# Patient Record
Sex: MALE | Race: WHITE | NOT HISPANIC OR LATINO
[De-identification: names, ages, dates, MRNs, and addresses within clinical notes are randomized per-mention and may not be internally consistent; named-entity substitution may affect disease eponyms.]

---

## 2018-06-25 PROBLEM — Z00.00 ENCOUNTER FOR PREVENTIVE HEALTH EXAMINATION: Status: ACTIVE | Noted: 2018-06-25

## 2020-03-12 ENCOUNTER — APPOINTMENT (OUTPATIENT)
Dept: PULMONOLOGY | Facility: CLINIC | Age: 50
End: 2020-03-12
Payer: COMMERCIAL

## 2020-03-12 VITALS
WEIGHT: 236 LBS | DIASTOLIC BLOOD PRESSURE: 60 MMHG | HEIGHT: 67 IN | SYSTOLIC BLOOD PRESSURE: 128 MMHG | HEART RATE: 66 BPM | BODY MASS INDEX: 37.04 KG/M2 | OXYGEN SATURATION: 96 %

## 2020-03-12 DIAGNOSIS — E66.9 OBESITY, UNSPECIFIED: ICD-10-CM

## 2020-03-12 DIAGNOSIS — Z78.9 OTHER SPECIFIED HEALTH STATUS: ICD-10-CM

## 2020-03-12 DIAGNOSIS — G47.33 OBSTRUCTIVE SLEEP APNEA (ADULT) (PEDIATRIC): ICD-10-CM

## 2020-03-12 DIAGNOSIS — Z86.39 PERSONAL HISTORY OF OTHER ENDOCRINE, NUTRITIONAL AND METABOLIC DISEASE: ICD-10-CM

## 2020-03-12 DIAGNOSIS — R06.02 SHORTNESS OF BREATH: ICD-10-CM

## 2020-03-12 DIAGNOSIS — Z83.3 FAMILY HISTORY OF DIABETES MELLITUS: ICD-10-CM

## 2020-03-12 DIAGNOSIS — F17.290 NICOTINE DEPENDENCE, OTHER TOBACCO PRODUCT, UNCOMPLICATED: ICD-10-CM

## 2020-03-12 PROCEDURE — 99204 OFFICE O/P NEW MOD 45 MIN: CPT | Mod: 25

## 2020-03-12 PROCEDURE — 94010 BREATHING CAPACITY TEST: CPT

## 2020-03-12 RX ORDER — ATORVASTATIN CALCIUM 20 MG/1
20 TABLET, FILM COATED ORAL
Refills: 0 | Status: ACTIVE | COMMUNITY

## 2020-03-12 RX ORDER — VALACYCLOVIR HYDROCHLORIDE 1 G/1
1 TABLET, FILM COATED ORAL
Refills: 0 | Status: ACTIVE | COMMUNITY

## 2020-03-12 RX ORDER — METFORMIN HYDROCHLORIDE 500 MG/1
500 TABLET, COATED ORAL
Refills: 0 | Status: ACTIVE | COMMUNITY

## 2020-03-12 NOTE — REASON FOR VISIT
[Initial] : an initial visit [Sleep Apnea] : sleep apnea [Shortness of Breath] : shortness of breath [Obesity] : obesity

## 2020-03-12 NOTE — HISTORY OF PRESENT ILLNESS
[Never] : never [Excessive Daytime Sleepiness] : excessive daytime sleepiness [Snoring] : snoring [Unrefreshing Sleep] : unrefreshing sleep [Sleepy When Sedentary] : sleepy when sedentary [None] : The patient is not currently being treated for this problem [Initial Evaluation] : an initial evaluation of [Excess Weight] : excess weight [Currently Experiencing] : The patient is currently experiencing symptoms. [Dyspnea] : dyspnea [Low Calorie Diet] : low calorie diet [Fair Compliance] : fair compliance with treatment [Fair Tolerance] : fair tolerance of treatment [Poor Symptom Control] : poor symptom control [Dyslipidemia] : dyslipidemia [Sleep Apnea] : sleep apnea [Diabetes] : diabetes [High] : high [Low Calorie] : low calorie [Well Balanced Diet] : well balanced meals [Daily] : exercises daily [Walking] : walking [TextBox_4] : He reports [employer] exposure for 6 months and is followed by [facility] annually. He reports a recent clear CXR through [facility]. [TextBox_29] : reports occasional cigar use [Witnessed Apnea During Sleep] : no witnessed apnea during sleep [Witnessed Gasping During Sleep] : no witnessed gasping during sleep

## 2020-03-12 NOTE — REVIEW OF SYSTEMS
[SOB on Exertion] : sob on exertion [Diabetes] : diabetes [Fever] : no fever [Chills] : no chills [Dry Eyes] : no dry eyes [Epistaxis] : no epistaxis [Eye Irritation] : no eye irritation [Nasal Congestion] : no nasal congestion [Postnasal Drip] : no postnasal drip [Sinus Problems] : no sinus problems [Cough] : no cough [Chest Tightness] : no chest tightness [Sputum] : no sputum [Dyspnea] : no dyspnea [Pleuritic Pain] : no pleuritic pain [Wheezing] : no wheezing [Chest Discomfort] : no chest discomfort [Edema] : no edema [Palpitations] : no palpitations [Syncope] : no syncope [Hay Fever] : no hay fever [Itchy Eyes] : no itchy eyes [Seasonal Allergies] : no seasonal allergies [GERD] : no gerd [Abdominal Pain] : no abdominal pain [Nausea] : no nausea [Vomiting] : no vomiting [Diarrhea] : no diarrhea [Constipation] : no constipation [Dysuria] : no urgency [Back Pain] : no back pain [Anemia] : no anemia [Headache] : no headache [Seizures] : no seizures [Dizziness] : no dizziness [Numbness] : no numbness [Paralysis] : no paralysis [Confusion] : no confusion [Depression] : no depression [Anxiety] : no anxiety [Thyroid Problem] : no thyroid problem

## 2020-03-12 NOTE — CONSULT LETTER
[Dear  ___] : Dear  [unfilled], [Consult Letter:] : I had the pleasure of evaluating your patient, [unfilled]. [Please see my note below.] : Please see my note below. [Consult Closing:] : Thank you very much for allowing me to participate in the care of this patient.  If you have any questions, please do not hesitate to contact me. [Sincerely,] : Sincerely, [FreeTextEntry3] : Jerrell Renee MD, FCCP, D. ABSM\par Pulmonary and Sleep Medicine\par [facility] [place]

## 2020-03-12 NOTE — PHYSICAL EXAM
[No Acute Distress] : no acute distress [Well Nourished] : well nourished [Well Developed] : well developed [Low Lying Soft Palate] : low lying soft palate [Enlarged Base of the Tongue] : enlarged base of the tongue [III] : Mallampati Class: III [Normal Appearance] : normal appearance [Supple] : supple [Normal Rate/Rhythm] : normal rate/rhythm [Normal S1, S2] : normal s1, s2 [No Murmurs] : no murmurs [No Resp Distress] : no resp distress [No Acc Muscle Use] : no acc muscle use [Normal Rhythm and Effort] : normal rhythm and effort [Clear to Auscultation Bilaterally] : clear to auscultation bilaterally [No Abnormalities] : no abnormalities [Benign] : benign [Not Tender] : not tender [Soft] : soft [Normal Gait] : normal gait [No Clubbing] : no clubbing [No Cyanosis] : no cyanosis [No Edema] : no edema [No Focal Deficits] : no focal deficits [Oriented x3] : oriented x3

## 2020-03-12 NOTE — DISCUSSION/SUMMARY
[FreeTextEntry1] : \par #1. Spirometry performed today was essentially normal.\par #2. The patient does not appear to require chronic BD therapy at this time\par #3. SOBOE is likely related to weight or deconditioning given normal spirometry\par #4. Diet and exercise for weight loss\par #5. S/N PSG to evaluate for possible JOSÉ MIGUEL\par #6. Pt to obtain prior CXR results from [facility]\par #7. F/u after S/N PSG\par

## 2021-06-12 ENCOUNTER — EMERGENCY (EMERGENCY)
Facility: HOSPITAL | Age: 51
LOS: 1 days | Discharge: HOME | End: 2021-06-12
Attending: EMERGENCY MEDICINE
Payer: COMMERCIAL

## 2021-06-12 VITALS
SYSTOLIC BLOOD PRESSURE: 134 MMHG | TEMPERATURE: 100 F | WEIGHT: 250 LBS | RESPIRATION RATE: 18 BRPM | HEIGHT: 67 IN | HEART RATE: 89 BPM | OXYGEN SATURATION: 94 % | DIASTOLIC BLOOD PRESSURE: 89 MMHG

## 2021-06-12 LAB
BASOPHILS # BLD AUTO: 0.05 K/UL — SIGNIFICANT CHANGE UP (ref 0–0.2)
BASOPHILS NFR BLD AUTO: 0.4 % — SIGNIFICANT CHANGE UP (ref 0–2)
EOSINOPHIL # BLD AUTO: 0.13 K/UL — SIGNIFICANT CHANGE UP (ref 0–0.5)
EOSINOPHIL NFR BLD AUTO: 1.1 % — SIGNIFICANT CHANGE UP (ref 0–6)
HCT VFR BLD CALC: 43.4 % — SIGNIFICANT CHANGE UP (ref 39–50)
HGB BLD-MCNC: 14.9 G/DL — SIGNIFICANT CHANGE UP (ref 13–17)
IMM GRANULOCYTES NFR BLD AUTO: 0.2 % — SIGNIFICANT CHANGE UP (ref 0–1.5)
LYMPHOCYTES # BLD AUTO: 1.54 K/UL — SIGNIFICANT CHANGE UP (ref 1–3.3)
LYMPHOCYTES # BLD AUTO: 12.8 % — LOW (ref 13–44)
MCHC RBC-ENTMCNC: 30.4 PG — SIGNIFICANT CHANGE UP (ref 27–34)
MCHC RBC-ENTMCNC: 34.3 GM/DL — SIGNIFICANT CHANGE UP (ref 32–36)
MCV RBC AUTO: 88.6 FL — SIGNIFICANT CHANGE UP (ref 80–100)
MONOCYTES # BLD AUTO: 0.88 K/UL — SIGNIFICANT CHANGE UP (ref 0–0.9)
MONOCYTES NFR BLD AUTO: 7.3 % — SIGNIFICANT CHANGE UP (ref 2–14)
NEUTROPHILS # BLD AUTO: 9.42 K/UL — HIGH (ref 1.8–7.4)
NEUTROPHILS NFR BLD AUTO: 78.2 % — HIGH (ref 43–77)
PLATELET # BLD AUTO: 184 K/UL — SIGNIFICANT CHANGE UP (ref 150–400)
RBC # BLD: 4.9 M/UL — SIGNIFICANT CHANGE UP (ref 4.2–5.8)
RBC # FLD: 12.5 % — SIGNIFICANT CHANGE UP (ref 10.3–14.5)
WBC # BLD: 12.05 K/UL — HIGH (ref 3.8–10.5)
WBC # FLD AUTO: 12.05 K/UL — HIGH (ref 3.8–10.5)

## 2021-06-12 PROCEDURE — 99284 EMERGENCY DEPT VISIT MOD MDM: CPT

## 2021-06-12 RX ORDER — SODIUM CHLORIDE 9 MG/ML
1000 INJECTION INTRAMUSCULAR; INTRAVENOUS; SUBCUTANEOUS ONCE
Refills: 0 | Status: COMPLETED | OUTPATIENT
Start: 2021-06-12 | End: 2021-06-12

## 2021-06-12 RX ORDER — ACETAMINOPHEN 500 MG
650 TABLET ORAL ONCE
Refills: 0 | Status: COMPLETED | OUTPATIENT
Start: 2021-06-12 | End: 2021-06-12

## 2021-06-12 RX ORDER — KETOROLAC TROMETHAMINE 30 MG/ML
30 SYRINGE (ML) INJECTION ONCE
Refills: 0 | Status: DISCONTINUED | OUTPATIENT
Start: 2021-06-12 | End: 2021-06-12

## 2021-06-12 RX ADMIN — Medication 30 MILLIGRAM(S): at 23:47

## 2021-06-12 RX ADMIN — Medication 650 MILLIGRAM(S): at 23:47

## 2021-06-12 RX ADMIN — SODIUM CHLORIDE 1000 MILLILITER(S): 9 INJECTION INTRAMUSCULAR; INTRAVENOUS; SUBCUTANEOUS at 23:47

## 2021-06-12 NOTE — ED PROVIDER NOTE - PROGRESS NOTE DETAILS
advised on ct results abx and fu with ent   given strict return precautions verbalizes understanding

## 2021-06-12 NOTE — ED PROVIDER NOTE - CARE PROVIDER_API CALL
Tang Larsen)  Otolaryngology  [address] [place] [address], [place] [address]  Phone: [phone]  Fax: [phone]  Follow Up Time: 1-3 Days

## 2021-06-12 NOTE — ED PROVIDER NOTE - CLINICAL SUMMARY MEDICAL DECISION MAKING FREE TEXT BOX
52 yo male with 2 days of left jaw swelling and pain, febrile in ER with + mass to left lateral jaw with + cervical lymphadenopathy. labs ct with iv con

## 2021-06-12 NOTE — ED PROVIDER NOTE - PHYSICAL EXAMINATION
+ swelling to left lateral jaw line + cervical lymphadenopathy FROM of neck   EAR: + external ear canal with swelling no secondary signs of infection

## 2021-06-12 NOTE — ED PROVIDER NOTE - ATTENDING CONTRIBUTION TO CARE
I, Shalonda Hutchins, independently evaluated the patient. The PA made the initial evaluation and discussed history, physical and plan with me and I agree. I examined the patient noting left neck edema with TTP, lymphadenopathy, encroaching on the left external auditory canal, no dental abscess or posterior oropharyngeal edeam, and discussed need for labs, imaging. I discussed indications to return to the ED and the importance of proper follow up with PMD. Patient verbalizes understanding and is comfortable with discharge at this time.

## 2021-06-12 NOTE — ED PROVIDER NOTE - NSFOLLOWUPINSTRUCTIONS_ED_ALL_ED_FT
please follow with ENT   please take prescribed medications as directed   new or worsening symptoms please return to the ER

## 2021-06-12 NOTE — ED ADULT NURSE REASSESSMENT NOTE - NS ED NURSE REASSESS COMMENT FT1
Pt in no apparent distress at this time. Airway patent, breathing spontaneous and nonlabored. Pt A&Ox3 resting in stretcher. Pt c/o       , facial swelling and pain to left side near ear/jaw

## 2021-06-12 NOTE — ED PROVIDER NOTE - OBJECTIVE STATEMENT
50 yo male presenting with pain to the left sided of the face behind the jaw, no recent sick contacts no travel. states that bothered him on friday night, increased swelling this morning. no difficulty swallowing no changes in voice, no pain in the ear.   no uri symptoms   no recent dental work   no reported health issues, does not see a doctor regularly.   smoker daily cigars, x 3 years,   no illicit drug use, social drinker

## 2021-06-13 VITALS
RESPIRATION RATE: 18 BRPM | DIASTOLIC BLOOD PRESSURE: 84 MMHG | OXYGEN SATURATION: 93 % | SYSTOLIC BLOOD PRESSURE: 141 MMHG | HEART RATE: 70 BPM | TEMPERATURE: 99 F

## 2021-06-13 LAB
ALBUMIN SERPL ELPH-MCNC: 4 G/DL — SIGNIFICANT CHANGE UP (ref 3.3–5.2)
ALP SERPL-CCNC: 63 U/L — SIGNIFICANT CHANGE UP (ref 40–120)
ALT FLD-CCNC: 20 U/L — SIGNIFICANT CHANGE UP
ANION GAP SERPL CALC-SCNC: 13 MMOL/L — SIGNIFICANT CHANGE UP (ref 5–17)
AST SERPL-CCNC: 19 U/L — SIGNIFICANT CHANGE UP
BILIRUB SERPL-MCNC: 0.2 MG/DL — LOW (ref 0.4–2)
BUN SERPL-MCNC: 21 MG/DL — HIGH (ref 8–20)
CALCIUM SERPL-MCNC: 9 MG/DL — SIGNIFICANT CHANGE UP (ref 8.6–10.2)
CHLORIDE SERPL-SCNC: 101 MMOL/L — SIGNIFICANT CHANGE UP (ref 98–107)
CO2 SERPL-SCNC: 24 MMOL/L — SIGNIFICANT CHANGE UP (ref 22–29)
CREAT SERPL-MCNC: 1.05 MG/DL — SIGNIFICANT CHANGE UP (ref 0.5–1.3)
GLUCOSE SERPL-MCNC: 201 MG/DL — HIGH (ref 70–99)
LACTATE BLDV-MCNC: 1.1 MMOL/L — SIGNIFICANT CHANGE UP (ref 0.5–2)
POTASSIUM SERPL-MCNC: 3.8 MMOL/L — SIGNIFICANT CHANGE UP (ref 3.5–5.3)
POTASSIUM SERPL-SCNC: 3.8 MMOL/L — SIGNIFICANT CHANGE UP (ref 3.5–5.3)
PROT SERPL-MCNC: 7.1 G/DL — SIGNIFICANT CHANGE UP (ref 6.6–8.7)
SODIUM SERPL-SCNC: 138 MMOL/L — SIGNIFICANT CHANGE UP (ref 135–145)

## 2021-06-13 PROCEDURE — 70491 CT SOFT TISSUE NECK W/DYE: CPT

## 2021-06-13 PROCEDURE — 99284 EMERGENCY DEPT VISIT MOD MDM: CPT | Mod: 25

## 2021-06-13 PROCEDURE — 70491 CT SOFT TISSUE NECK W/DYE: CPT | Mod: 26,MD

## 2021-06-13 PROCEDURE — 85025 COMPLETE CBC W/AUTO DIFF WBC: CPT

## 2021-06-13 PROCEDURE — 87040 BLOOD CULTURE FOR BACTERIA: CPT

## 2021-06-13 PROCEDURE — 70487 CT MAXILLOFACIAL W/DYE: CPT | Mod: 26,MD

## 2021-06-13 PROCEDURE — 80053 COMPREHEN METABOLIC PANEL: CPT

## 2021-06-13 PROCEDURE — 83605 ASSAY OF LACTIC ACID: CPT

## 2021-06-13 PROCEDURE — 70487 CT MAXILLOFACIAL W/DYE: CPT

## 2021-06-13 PROCEDURE — 36415 COLL VENOUS BLD VENIPUNCTURE: CPT

## 2021-06-13 PROCEDURE — 96374 THER/PROPH/DIAG INJ IV PUSH: CPT | Mod: XU

## 2021-06-13 RX ORDER — IBUPROFEN 200 MG
1 TABLET ORAL
Qty: 40 | Refills: 0
Start: 2021-06-13 | End: 2021-06-22

## 2021-06-13 RX ORDER — ACETAMINOPHEN 500 MG
2 TABLET ORAL
Qty: 80 | Refills: 0
Start: 2021-06-13 | End: 2021-06-22

## 2021-06-13 RX ADMIN — Medication 1 TABLET(S): at 02:14

## 2021-06-16 ENCOUNTER — INPATIENT (INPATIENT)
Facility: HOSPITAL | Age: 51
LOS: 6 days | Discharge: HOME | DRG: 872 | End: 2021-06-23
Attending: INTERNAL MEDICINE | Admitting: INTERNAL MEDICINE
Payer: COMMERCIAL

## 2021-06-16 VITALS
TEMPERATURE: 99 F | WEIGHT: 182.98 LBS | RESPIRATION RATE: 18 BRPM | OXYGEN SATURATION: 97 % | SYSTOLIC BLOOD PRESSURE: 150 MMHG | HEIGHT: 67 IN | HEART RATE: 110 BPM | DIASTOLIC BLOOD PRESSURE: 92 MMHG

## 2021-06-16 DIAGNOSIS — K11.20 SIALOADENITIS, UNSPECIFIED: ICD-10-CM

## 2021-06-16 LAB
ALBUMIN SERPL ELPH-MCNC: 4 G/DL — SIGNIFICANT CHANGE UP (ref 3.3–5.2)
ALP SERPL-CCNC: 74 U/L — SIGNIFICANT CHANGE UP (ref 40–120)
ALT FLD-CCNC: 17 U/L — SIGNIFICANT CHANGE UP
ANION GAP SERPL CALC-SCNC: 12 MMOL/L — SIGNIFICANT CHANGE UP (ref 5–17)
AST SERPL-CCNC: 10 U/L — SIGNIFICANT CHANGE UP
BASOPHILS # BLD AUTO: 0.04 K/UL — SIGNIFICANT CHANGE UP (ref 0–0.2)
BASOPHILS NFR BLD AUTO: 0.3 % — SIGNIFICANT CHANGE UP (ref 0–2)
BILIRUB SERPL-MCNC: 0.3 MG/DL — LOW (ref 0.4–2)
BUN SERPL-MCNC: 17.7 MG/DL — SIGNIFICANT CHANGE UP (ref 8–20)
CALCIUM SERPL-MCNC: 9.3 MG/DL — SIGNIFICANT CHANGE UP (ref 8.6–10.2)
CHLORIDE SERPL-SCNC: 98 MMOL/L — SIGNIFICANT CHANGE UP (ref 98–107)
CO2 SERPL-SCNC: 27 MMOL/L — SIGNIFICANT CHANGE UP (ref 22–29)
CREAT SERPL-MCNC: 1 MG/DL — SIGNIFICANT CHANGE UP (ref 0.5–1.3)
EOSINOPHIL # BLD AUTO: 0.08 K/UL — SIGNIFICANT CHANGE UP (ref 0–0.5)
EOSINOPHIL NFR BLD AUTO: 0.5 % — SIGNIFICANT CHANGE UP (ref 0–6)
GLUCOSE BLDC GLUCOMTR-MCNC: 325 MG/DL — HIGH (ref 70–99)
GLUCOSE SERPL-MCNC: 267 MG/DL — HIGH (ref 70–99)
HCT VFR BLD CALC: 44.4 % — SIGNIFICANT CHANGE UP (ref 39–50)
HGB BLD-MCNC: 14.8 G/DL — SIGNIFICANT CHANGE UP (ref 13–17)
IMM GRANULOCYTES NFR BLD AUTO: 1 % — SIGNIFICANT CHANGE UP (ref 0–1.5)
LACTATE BLDV-MCNC: 1.5 MMOL/L — SIGNIFICANT CHANGE UP (ref 0.5–2)
LYMPHOCYTES # BLD AUTO: 1.51 K/UL — SIGNIFICANT CHANGE UP (ref 1–3.3)
LYMPHOCYTES # BLD AUTO: 9.6 % — LOW (ref 13–44)
MCHC RBC-ENTMCNC: 30.1 PG — SIGNIFICANT CHANGE UP (ref 27–34)
MCHC RBC-ENTMCNC: 33.3 GM/DL — SIGNIFICANT CHANGE UP (ref 32–36)
MCV RBC AUTO: 90.2 FL — SIGNIFICANT CHANGE UP (ref 80–100)
MONOCYTES # BLD AUTO: 0.78 K/UL — SIGNIFICANT CHANGE UP (ref 0–0.9)
MONOCYTES NFR BLD AUTO: 5 % — SIGNIFICANT CHANGE UP (ref 2–14)
NEUTROPHILS # BLD AUTO: 13.14 K/UL — HIGH (ref 1.8–7.4)
NEUTROPHILS NFR BLD AUTO: 83.6 % — HIGH (ref 43–77)
PLATELET # BLD AUTO: 233 K/UL — SIGNIFICANT CHANGE UP (ref 150–400)
POTASSIUM SERPL-MCNC: 3.6 MMOL/L — SIGNIFICANT CHANGE UP (ref 3.5–5.3)
POTASSIUM SERPL-SCNC: 3.6 MMOL/L — SIGNIFICANT CHANGE UP (ref 3.5–5.3)
PROT SERPL-MCNC: 7.5 G/DL — SIGNIFICANT CHANGE UP (ref 6.6–8.7)
RBC # BLD: 4.92 M/UL — SIGNIFICANT CHANGE UP (ref 4.2–5.8)
RBC # FLD: 12.2 % — SIGNIFICANT CHANGE UP (ref 10.3–14.5)
SARS-COV-2 RNA SPEC QL NAA+PROBE: SIGNIFICANT CHANGE UP
SODIUM SERPL-SCNC: 137 MMOL/L — SIGNIFICANT CHANGE UP (ref 135–145)
WBC # BLD: 15.7 K/UL — HIGH (ref 3.8–10.5)
WBC # FLD AUTO: 15.7 K/UL — HIGH (ref 3.8–10.5)

## 2021-06-16 PROCEDURE — 76536 US EXAM OF HEAD AND NECK: CPT | Mod: 26

## 2021-06-16 PROCEDURE — 99223 1ST HOSP IP/OBS HIGH 75: CPT

## 2021-06-16 PROCEDURE — 70487 CT MAXILLOFACIAL W/DYE: CPT | Mod: 26

## 2021-06-16 PROCEDURE — 99497 ADVNCD CARE PLAN 30 MIN: CPT | Mod: 25

## 2021-06-16 PROCEDURE — 99222 1ST HOSP IP/OBS MODERATE 55: CPT

## 2021-06-16 PROCEDURE — 99285 EMERGENCY DEPT VISIT HI MDM: CPT

## 2021-06-16 PROCEDURE — 70491 CT SOFT TISSUE NECK W/DYE: CPT | Mod: 26

## 2021-06-16 RX ORDER — ACETAMINOPHEN 500 MG
650 TABLET ORAL ONCE
Refills: 0 | Status: COMPLETED | OUTPATIENT
Start: 2021-06-16 | End: 2021-06-16

## 2021-06-16 RX ORDER — SODIUM CHLORIDE 9 MG/ML
1000 INJECTION, SOLUTION INTRAVENOUS
Refills: 0 | Status: DISCONTINUED | OUTPATIENT
Start: 2021-06-16 | End: 2021-06-23

## 2021-06-16 RX ORDER — MORPHINE SULFATE 50 MG/1
6 CAPSULE, EXTENDED RELEASE ORAL ONCE
Refills: 0 | Status: DISCONTINUED | OUTPATIENT
Start: 2021-06-16 | End: 2021-06-16

## 2021-06-16 RX ORDER — DEXTROSE 50 % IN WATER 50 %
15 SYRINGE (ML) INTRAVENOUS ONCE
Refills: 0 | Status: DISCONTINUED | OUTPATIENT
Start: 2021-06-16 | End: 2021-06-23

## 2021-06-16 RX ORDER — DEXTROSE 50 % IN WATER 50 %
25 SYRINGE (ML) INTRAVENOUS ONCE
Refills: 0 | Status: DISCONTINUED | OUTPATIENT
Start: 2021-06-16 | End: 2021-06-23

## 2021-06-16 RX ORDER — ACETAMINOPHEN 500 MG
325 TABLET ORAL EVERY 4 HOURS
Refills: 0 | Status: DISCONTINUED | OUTPATIENT
Start: 2021-06-16 | End: 2021-06-23

## 2021-06-16 RX ORDER — PIPERACILLIN AND TAZOBACTAM 4; .5 G/20ML; G/20ML
3.38 INJECTION, POWDER, LYOPHILIZED, FOR SOLUTION INTRAVENOUS ONCE
Refills: 0 | Status: COMPLETED | OUTPATIENT
Start: 2021-06-16 | End: 2021-06-16

## 2021-06-16 RX ORDER — PIPERACILLIN AND TAZOBACTAM 4; .5 G/20ML; G/20ML
3.38 INJECTION, POWDER, LYOPHILIZED, FOR SOLUTION INTRAVENOUS EVERY 8 HOURS
Refills: 0 | Status: DISCONTINUED | OUTPATIENT
Start: 2021-06-16 | End: 2021-06-17

## 2021-06-16 RX ORDER — MORPHINE SULFATE 50 MG/1
2 CAPSULE, EXTENDED RELEASE ORAL EVERY 4 HOURS
Refills: 0 | Status: DISCONTINUED | OUTPATIENT
Start: 2021-06-16 | End: 2021-06-22

## 2021-06-16 RX ORDER — HYDROMORPHONE HYDROCHLORIDE 2 MG/ML
0.5 INJECTION INTRAMUSCULAR; INTRAVENOUS; SUBCUTANEOUS ONCE
Refills: 0 | Status: DISCONTINUED | OUTPATIENT
Start: 2021-06-16 | End: 2021-06-16

## 2021-06-16 RX ORDER — MORPHINE SULFATE 50 MG/1
4 CAPSULE, EXTENDED RELEASE ORAL ONCE
Refills: 0 | Status: DISCONTINUED | OUTPATIENT
Start: 2021-06-16 | End: 2021-06-16

## 2021-06-16 RX ORDER — SODIUM CHLORIDE 9 MG/ML
1000 INJECTION INTRAMUSCULAR; INTRAVENOUS; SUBCUTANEOUS
Refills: 0 | Status: DISCONTINUED | OUTPATIENT
Start: 2021-06-16 | End: 2021-06-19

## 2021-06-16 RX ORDER — DEXTROSE 50 % IN WATER 50 %
12.5 SYRINGE (ML) INTRAVENOUS ONCE
Refills: 0 | Status: DISCONTINUED | OUTPATIENT
Start: 2021-06-16 | End: 2021-06-23

## 2021-06-16 RX ORDER — HEPARIN SODIUM 5000 [USP'U]/ML
5000 INJECTION INTRAVENOUS; SUBCUTANEOUS EVERY 12 HOURS
Refills: 0 | Status: DISCONTINUED | OUTPATIENT
Start: 2021-06-16 | End: 2021-06-23

## 2021-06-16 RX ORDER — GLUCAGON INJECTION, SOLUTION 0.5 MG/.1ML
1 INJECTION, SOLUTION SUBCUTANEOUS ONCE
Refills: 0 | Status: DISCONTINUED | OUTPATIENT
Start: 2021-06-16 | End: 2021-06-23

## 2021-06-16 RX ORDER — INSULIN LISPRO 100/ML
VIAL (ML) SUBCUTANEOUS
Refills: 0 | Status: DISCONTINUED | OUTPATIENT
Start: 2021-06-16 | End: 2021-06-23

## 2021-06-16 RX ADMIN — MORPHINE SULFATE 2 MILLIGRAM(S): 50 CAPSULE, EXTENDED RELEASE ORAL at 19:33

## 2021-06-16 RX ADMIN — MORPHINE SULFATE 2 MILLIGRAM(S): 50 CAPSULE, EXTENDED RELEASE ORAL at 13:55

## 2021-06-16 RX ADMIN — Medication 4: at 17:26

## 2021-06-16 RX ADMIN — HYDROMORPHONE HYDROCHLORIDE 0.5 MILLIGRAM(S): 2 INJECTION INTRAMUSCULAR; INTRAVENOUS; SUBCUTANEOUS at 20:37

## 2021-06-16 RX ADMIN — HYDROMORPHONE HYDROCHLORIDE 0.5 MILLIGRAM(S): 2 INJECTION INTRAMUSCULAR; INTRAVENOUS; SUBCUTANEOUS at 20:07

## 2021-06-16 RX ADMIN — Medication 100 MILLIGRAM(S): at 07:02

## 2021-06-16 RX ADMIN — MORPHINE SULFATE 2 MILLIGRAM(S): 50 CAPSULE, EXTENDED RELEASE ORAL at 16:38

## 2021-06-16 RX ADMIN — MORPHINE SULFATE 6 MILLIGRAM(S): 50 CAPSULE, EXTENDED RELEASE ORAL at 05:26

## 2021-06-16 RX ADMIN — MORPHINE SULFATE 2 MILLIGRAM(S): 50 CAPSULE, EXTENDED RELEASE ORAL at 23:47

## 2021-06-16 RX ADMIN — Medication 650 MILLIGRAM(S): at 10:15

## 2021-06-16 RX ADMIN — MORPHINE SULFATE 2 MILLIGRAM(S): 50 CAPSULE, EXTENDED RELEASE ORAL at 19:03

## 2021-06-16 RX ADMIN — HYDROMORPHONE HYDROCHLORIDE 0.5 MILLIGRAM(S): 2 INJECTION INTRAMUSCULAR; INTRAVENOUS; SUBCUTANEOUS at 06:53

## 2021-06-16 RX ADMIN — MORPHINE SULFATE 6 MILLIGRAM(S): 50 CAPSULE, EXTENDED RELEASE ORAL at 07:04

## 2021-06-16 RX ADMIN — MORPHINE SULFATE 4 MILLIGRAM(S): 50 CAPSULE, EXTENDED RELEASE ORAL at 10:46

## 2021-06-16 RX ADMIN — Medication 650 MILLIGRAM(S): at 11:00

## 2021-06-16 RX ADMIN — PIPERACILLIN AND TAZOBACTAM 200 GRAM(S): 4; .5 INJECTION, POWDER, LYOPHILIZED, FOR SOLUTION INTRAVENOUS at 13:54

## 2021-06-16 RX ADMIN — MORPHINE SULFATE 4 MILLIGRAM(S): 50 CAPSULE, EXTENDED RELEASE ORAL at 11:01

## 2021-06-16 RX ADMIN — MORPHINE SULFATE 4 MILLIGRAM(S): 50 CAPSULE, EXTENDED RELEASE ORAL at 10:16

## 2021-06-16 RX ADMIN — Medication 600 MILLIGRAM(S): at 08:17

## 2021-06-16 RX ADMIN — PIPERACILLIN AND TAZOBACTAM 25 GRAM(S): 4; .5 INJECTION, POWDER, LYOPHILIZED, FOR SOLUTION INTRAVENOUS at 22:31

## 2021-06-16 RX ADMIN — HYDROMORPHONE HYDROCHLORIDE 0.5 MILLIGRAM(S): 2 INJECTION INTRAMUSCULAR; INTRAVENOUS; SUBCUTANEOUS at 07:04

## 2021-06-16 NOTE — ED PROVIDER NOTE - PHYSICAL EXAMINATION
General: In NAD, non-toxic/well-appearing; well nourished/developed.  Skin: No rashes or lesions. Warm, dry, color normal for race.   Head/Face: Normocephalic/atraumatic. Left sided facial swelling and tenderness. Area of induration over parotid gland, no fluctuance.   Eyes: Sclera anicteric, conjunctivae clear b/l. PERRLA, EOMI.   Throat: Airway patent. Tolerating secretions, no drooling. Limited, 2/2 pain unable to tolerate opening mouth.   Neck: Supple, FROM. Mild erythema under chin.   Cardio: Rate and rhythm regular. No audible murmur, gallop, or rub.  Resp: Speaking in full sentences. No visible nasal flaring, retractions, or deformity. Normal AP to lateral diameter, symmetrical excursion b/l. Breath sounds vesicular, symmetrical and without rales, rhonchi or wheezing b/l.  MSK: MAEx4. FROM.   Neuro: A&Ox3. No motor/sensory deficits.

## 2021-06-16 NOTE — ED PROVIDER NOTE - PROGRESS NOTE DETAILS
Scot: pt keyana dout to me pending u/s. temp to 100.6, wbc increased from 12k to 15k despite outpt po abx. ent consulted, no callback yet. u/s without discrete collection. pt c/o worsening pain/swelling. admited for abx/ent/further treatment.

## 2021-06-16 NOTE — H&P ADULT - CONVERSATION DETAILS
Pt states that he is concerned that he may progressively deteriorate. Reassured that at this time his infection appears to be limited to the parotid gland only. Pt wishes for all intervention (CPR/Intubation) if needed

## 2021-06-16 NOTE — ED ADULT TRIAGE NOTE - CHIEF COMPLAINT QUOTE
patient states that he was seen here on saturday for left sided jaw pain and swelling, patient states that he was sent home on abx, and the pain has become worse and the swelling has increased

## 2021-06-16 NOTE — ED PROVIDER NOTE - ATTENDING CONTRIBUTION TO CARE
52 yo uncomfortable appearing male with persistent/worsening left jaw swelling and pain. I personally saw the patient with the PA, and completed the key components of the history and physical exam. I then discussed the management plan with the PA.

## 2021-06-16 NOTE — H&P ADULT - NSHPPHYSICALEXAM_GEN_ALL_CORE
Gen : Middle aged male lying in bed, comfortable   HEENT : NCAT, signifcant swelling over left angle of mandible, tender to touch, no focal area of fluctuance appreciated, no internal os drainage on milking of duct , PERRLA   CVS : S1S2, no MRG  Abd : Soft non tender    : No testicular swelling or pain, cough reflex normal  Ext : No swelling or tenderness  Neuro : AAOx3, no focal deficits, facial nerve distribution intact

## 2021-06-16 NOTE — H&P ADULT - HISTORY OF PRESENT ILLNESS
Briefly, pt is a 51 year old male with DM (has refused medications, attempting to diet control) presents with increasing pain over left jaw. Reports that initially started as a lump at angle of jaw last Tuesday. Swelling continued and was associated with increasing pain in the region for which he presented to the ER and was prescribed Augmentin and referred to ENT. Was advised by ED to stat warm compresses nd abx changed to clindamycin. States that had very mild improvement in swelling and pain transiently for a day, however last Monday has started increasing in size again Now extremely painful.    Subjectively reports feeling warm with possible rigors.    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    No abdominal pain, changes in gastrointestinal or genitourinary habits, no testicular pain or swelling.

## 2021-06-16 NOTE — ED PROVIDER NOTE - CLINICAL SUMMARY MEDICAL DECISION MAKING FREE TEXT BOX
52 yo male no PMHx presents to ED c/o worsening left-sided jaw pain/swelling in setting of recently diagnosed with parotitis. Afebrile. Plan: labs, US, reassess.

## 2021-06-16 NOTE — ED PROVIDER NOTE - NSTIMEPROVIDERCAREINITIATE_GEN_ER
16-Jun-2021 04:54 I'd be happy to see him today, it appears there's been a few cancellations- an earlier spot would be even better, but whenever he can make it is fine.  Do we have a scale we can give him?  Rachna Holt PA-C 4/2/2019 9:58 AM

## 2021-06-16 NOTE — ED PROVIDER NOTE - OBJECTIVE STATEMENT
50 yo male no PMHx presents to ED c/o left sided jaw pain. Patient presented 6/12, diagnosed with parotitis. Presented to ENT specialist next day, abx changed from Augmentin to Clindamycin. Patient states pain and swelling is worsening. Last medicated with either ibuprofen or acetaminophen 2 hours PTA (unsure which). No further complaints at this time.   Denies fever. 52 yo male no PMHx presents to ED c/o left sided jaw pain x4 days. Patient presented 6/12, diagnosed with parotitis. Presented to ENT specialist next day, abx changed from Augmentin to Clindamycin. Patient states pain and swelling is worsening. Difficulty opening mouth. Last medicated with either ibuprofen or acetaminophen 2 hours PTA (unsure which). No further complaints at this time.   Denies fever, difficulty breathing.   ENT: [facility]

## 2021-06-16 NOTE — H&P ADULT - NSICDXFAMILYHX_GEN_ALL_CORE_FT
FAMILY HISTORY:  Grandparent  Still living? Unknown  FHx: diabetes mellitus, Age at diagnosis: Age Unknown

## 2021-06-16 NOTE — CHART NOTE - NSCHARTNOTEFT_GEN_A_CORE
ENT / ID evaluations appreciated  Repeat CT with IV contrast requested. Will start 0.9% NS for now to reduce risk of contrast nephropathy

## 2021-06-16 NOTE — ED PROVIDER NOTE - CHIEF COMPLAINT
Patient presents today for Alimta infusion  Patient offers no complaints  VSS  Labs within parameters to treat  Peripheral IV inserted without incident  Milagro Steen
Patient tolerated Alimta infusion without incident  Vitamin B12 injection administered as ordered  Patient aware of next appointment  AVS declined 
The patient is a 51y Male complaining of jaw pain.

## 2021-06-16 NOTE — H&P ADULT - ASSESSMENT
51 M with diet controlled DM presenting with worsening parotitis     Admit to medicine (any bed)  Sepsis   Secondary to parotitis, no fluid collection noted on CT or US  ER resident reported no drainage despite massage of duct > pt and partner deny any attempt by any MD/PA/RN  No drainage with milking of duct appreciated  Blood cultures x  ordered  (prior cultures NGTD)  Tylenol for pain/fever  Will start IV morphine 1-2 g for moderate/sever pain PRN  Piptazo IV for now  ID consulted, will follow recs    DM  Pt stats has refused medications in past, has tried diet control  Will start insulin lispro s/s for now  Send HbA1c in am     DVT ppx : Heparin s/c Q 12 (pt ambulatory

## 2021-06-16 NOTE — H&P ADULT - NSHPLABSRESULTS_GEN_ALL_CORE
Labs today show that your sodium is a little low.    Increase sodium in diet - more salt on food is ok.  Drink Pedialyte or gatorade     Recheck lab again next week    IF sodium is normal on recheck and you are still dizzy the next step would be to re scan the head ( CT SCAN)   US left parotid gland   IMPRESSION: No evidence of discrete collection within the left parotid gland.    CT maxillo facial    Mild asymmetric enlargement and hyperemia of the left parotid gland. No discrete mass lesion. Infiltration of the subcutaneous fat of the left lateral face extending to the submandibular region with thickening of the ipsilateral platysma musculature. Findings may be seen in the setting of a nonspecific parotiditis.    Left cervical chain lymphadenopathy, which may be reactive.    Clinical follow-up to resolution is recommended.    Nonspecific asymmetric effacement of the left piriform sinus with suboptimal evaluation of the left aryepiglottic fold, which may be related to apposition of mucosal surfaces. Direct visualization may be obtained as clinically warranted.

## 2021-06-16 NOTE — ED ADULT NURSE NOTE - OBJECTIVE STATEMENT
Patient A&Ox4 complaining of L Jaw pain x 1 week.  Pt states he was seen at [facility] and d/c with antbx.  Pt followed with ENT and states his jaw has doubled in size.  Came to ED tonight due to severe jaw pain.  NAD noted, respirations even and unlabored.  Safety precautions in place.  Plan of care explained, pt verbalized understanding. LUIS Murry at bedside for eval.

## 2021-06-17 LAB
A1C WITH ESTIMATED AVERAGE GLUCOSE RESULT: 7.8 % — HIGH (ref 4–5.6)
ANION GAP SERPL CALC-SCNC: 9 MMOL/L — SIGNIFICANT CHANGE UP (ref 5–17)
BUN SERPL-MCNC: 10.3 MG/DL — SIGNIFICANT CHANGE UP (ref 8–20)
CALCIUM SERPL-MCNC: 9.1 MG/DL — SIGNIFICANT CHANGE UP (ref 8.6–10.2)
CHLORIDE SERPL-SCNC: 94 MMOL/L — LOW (ref 98–107)
CO2 SERPL-SCNC: 30 MMOL/L — HIGH (ref 22–29)
COVID-19 SPIKE DOMAIN AB INTERP: POSITIVE
COVID-19 SPIKE DOMAIN ANTIBODY RESULT: >250 U/ML — HIGH
CREAT SERPL-MCNC: 0.96 MG/DL — SIGNIFICANT CHANGE UP (ref 0.5–1.3)
ESTIMATED AVERAGE GLUCOSE: 177 MG/DL — HIGH (ref 68–114)
GLUCOSE BLDC GLUCOMTR-MCNC: 181 MG/DL — HIGH (ref 70–99)
GLUCOSE BLDC GLUCOMTR-MCNC: 185 MG/DL — HIGH (ref 70–99)
GLUCOSE BLDC GLUCOMTR-MCNC: 225 MG/DL — HIGH (ref 70–99)
GLUCOSE BLDC GLUCOMTR-MCNC: 233 MG/DL — HIGH (ref 70–99)
GLUCOSE SERPL-MCNC: 224 MG/DL — HIGH (ref 70–99)
HCT VFR BLD CALC: 42.1 % — SIGNIFICANT CHANGE UP (ref 39–50)
HGB BLD-MCNC: 14.4 G/DL — SIGNIFICANT CHANGE UP (ref 13–17)
MCHC RBC-ENTMCNC: 30.4 PG — SIGNIFICANT CHANGE UP (ref 27–34)
MCHC RBC-ENTMCNC: 34.2 GM/DL — SIGNIFICANT CHANGE UP (ref 32–36)
MCV RBC AUTO: 89 FL — SIGNIFICANT CHANGE UP (ref 80–100)
PLATELET # BLD AUTO: 213 K/UL — SIGNIFICANT CHANGE UP (ref 150–400)
POTASSIUM SERPL-MCNC: 4 MMOL/L — SIGNIFICANT CHANGE UP (ref 3.5–5.3)
POTASSIUM SERPL-SCNC: 4 MMOL/L — SIGNIFICANT CHANGE UP (ref 3.5–5.3)
RBC # BLD: 4.73 M/UL — SIGNIFICANT CHANGE UP (ref 4.2–5.8)
RBC # FLD: 12.5 % — SIGNIFICANT CHANGE UP (ref 10.3–14.5)
SARS-COV-2 IGG+IGM SERPL QL IA: >250 U/ML — HIGH
SARS-COV-2 IGG+IGM SERPL QL IA: POSITIVE
SODIUM SERPL-SCNC: 133 MMOL/L — LOW (ref 135–145)
WBC # BLD: 10.71 K/UL — HIGH (ref 3.8–10.5)
WBC # FLD AUTO: 10.71 K/UL — HIGH (ref 3.8–10.5)

## 2021-06-17 PROCEDURE — 99222 1ST HOSP IP/OBS MODERATE 55: CPT

## 2021-06-17 PROCEDURE — 99232 SBSQ HOSP IP/OBS MODERATE 35: CPT

## 2021-06-17 PROCEDURE — 99233 SBSQ HOSP IP/OBS HIGH 50: CPT

## 2021-06-17 RX ORDER — INSULIN GLARGINE 100 [IU]/ML
10 INJECTION, SOLUTION SUBCUTANEOUS AT BEDTIME
Refills: 0 | Status: DISCONTINUED | OUTPATIENT
Start: 2021-06-17 | End: 2021-06-17

## 2021-06-17 RX ORDER — INSULIN LISPRO 100/ML
3 VIAL (ML) SUBCUTANEOUS
Refills: 0 | Status: DISCONTINUED | OUTPATIENT
Start: 2021-06-17 | End: 2021-06-20

## 2021-06-17 RX ORDER — HYDROMORPHONE HYDROCHLORIDE 2 MG/ML
0.5 INJECTION INTRAMUSCULAR; INTRAVENOUS; SUBCUTANEOUS ONCE
Refills: 0 | Status: DISCONTINUED | OUTPATIENT
Start: 2021-06-17 | End: 2021-06-17

## 2021-06-17 RX ORDER — HYDROMORPHONE HYDROCHLORIDE 2 MG/ML
0.5 INJECTION INTRAMUSCULAR; INTRAVENOUS; SUBCUTANEOUS EVERY 6 HOURS
Refills: 0 | Status: DISCONTINUED | OUTPATIENT
Start: 2021-06-17 | End: 2021-06-22

## 2021-06-17 RX ORDER — INSULIN GLARGINE 100 [IU]/ML
20 INJECTION, SOLUTION SUBCUTANEOUS AT BEDTIME
Refills: 0 | Status: DISCONTINUED | OUTPATIENT
Start: 2021-06-17 | End: 2021-06-23

## 2021-06-17 RX ORDER — MEROPENEM 1 G/30ML
1000 INJECTION INTRAVENOUS EVERY 8 HOURS
Refills: 0 | Status: DISCONTINUED | OUTPATIENT
Start: 2021-06-17 | End: 2021-06-21

## 2021-06-17 RX ADMIN — MORPHINE SULFATE 2 MILLIGRAM(S): 50 CAPSULE, EXTENDED RELEASE ORAL at 08:55

## 2021-06-17 RX ADMIN — INSULIN GLARGINE 20 UNIT(S): 100 INJECTION, SOLUTION SUBCUTANEOUS at 21:28

## 2021-06-17 RX ADMIN — SODIUM CHLORIDE 100 MILLILITER(S): 9 INJECTION INTRAMUSCULAR; INTRAVENOUS; SUBCUTANEOUS at 19:57

## 2021-06-17 RX ADMIN — Medication 2: at 10:49

## 2021-06-17 RX ADMIN — MORPHINE SULFATE 2 MILLIGRAM(S): 50 CAPSULE, EXTENDED RELEASE ORAL at 14:29

## 2021-06-17 RX ADMIN — MORPHINE SULFATE 2 MILLIGRAM(S): 50 CAPSULE, EXTENDED RELEASE ORAL at 20:02

## 2021-06-17 RX ADMIN — MEROPENEM 100 MILLIGRAM(S): 1 INJECTION INTRAVENOUS at 10:47

## 2021-06-17 RX ADMIN — Medication 325 MILLIGRAM(S): at 16:37

## 2021-06-17 RX ADMIN — Medication 3 UNIT(S): at 10:49

## 2021-06-17 RX ADMIN — SODIUM CHLORIDE 100 MILLILITER(S): 9 INJECTION INTRAMUSCULAR; INTRAVENOUS; SUBCUTANEOUS at 08:56

## 2021-06-17 RX ADMIN — Medication 1: at 17:14

## 2021-06-17 RX ADMIN — HEPARIN SODIUM 5000 UNIT(S): 5000 INJECTION INTRAVENOUS; SUBCUTANEOUS at 05:16

## 2021-06-17 RX ADMIN — HYDROMORPHONE HYDROCHLORIDE 0.5 MILLIGRAM(S): 2 INJECTION INTRAMUSCULAR; INTRAVENOUS; SUBCUTANEOUS at 16:36

## 2021-06-17 RX ADMIN — Medication 3 UNIT(S): at 17:14

## 2021-06-17 RX ADMIN — HYDROMORPHONE HYDROCHLORIDE 0.5 MILLIGRAM(S): 2 INJECTION INTRAMUSCULAR; INTRAVENOUS; SUBCUTANEOUS at 05:01

## 2021-06-17 RX ADMIN — Medication 100 MILLIGRAM(S): at 10:47

## 2021-06-17 RX ADMIN — SODIUM CHLORIDE 100 MILLILITER(S): 9 INJECTION INTRAMUSCULAR; INTRAVENOUS; SUBCUTANEOUS at 21:18

## 2021-06-17 RX ADMIN — PIPERACILLIN AND TAZOBACTAM 25 GRAM(S): 4; .5 INJECTION, POWDER, LYOPHILIZED, FOR SOLUTION INTRAVENOUS at 05:15

## 2021-06-17 RX ADMIN — MORPHINE SULFATE 2 MILLIGRAM(S): 50 CAPSULE, EXTENDED RELEASE ORAL at 20:40

## 2021-06-17 RX ADMIN — MEROPENEM 100 MILLIGRAM(S): 1 INJECTION INTRAVENOUS at 17:14

## 2021-06-17 RX ADMIN — Medication 100 MILLIGRAM(S): at 17:14

## 2021-06-17 RX ADMIN — HEPARIN SODIUM 5000 UNIT(S): 5000 INJECTION INTRAVENOUS; SUBCUTANEOUS at 18:36

## 2021-06-17 RX ADMIN — Medication 2: at 07:48

## 2021-06-17 RX ADMIN — HYDROMORPHONE HYDROCHLORIDE 0.5 MILLIGRAM(S): 2 INJECTION INTRAMUSCULAR; INTRAVENOUS; SUBCUTANEOUS at 04:31

## 2021-06-17 RX ADMIN — Medication 100 MILLIGRAM(S): at 21:18

## 2021-06-17 RX ADMIN — MEROPENEM 100 MILLIGRAM(S): 1 INJECTION INTRAVENOUS at 21:18

## 2021-06-17 RX ADMIN — MORPHINE SULFATE 2 MILLIGRAM(S): 50 CAPSULE, EXTENDED RELEASE ORAL at 00:17

## 2021-06-17 NOTE — PROGRESS NOTE ADULT - SUBJECTIVE AND OBJECTIVE BOX
[facility] Division of Hospital Medicine    Chief Complaint:  Left face swelling     SUBJECTIVE / OVERNIGHT EVENTS:  No overnight events  This morning pt says his swelling is a bit better but he is still having a lot of pain   Says Morphine is not helping much    Patient denies chest pain, SOB, abd pain, N/V, fever, chills, dysuria or any other complaints. All remainder ROS negative.     MEDICATIONS  (STANDING):  clindamycin IVPB 900 milliGRAM(s) IV Intermittent every 8 hours  dextrose 40% Gel 15 Gram(s) Oral once  dextrose 5%. 1000 milliLiter(s) (50 mL/Hr) IV Continuous <Continuous>  dextrose 5%. 1000 milliLiter(s) (100 mL/Hr) IV Continuous <Continuous>  dextrose 50% Injectable 25 Gram(s) IV Push once  dextrose 50% Injectable 12.5 Gram(s) IV Push once  dextrose 50% Injectable 25 Gram(s) IV Push once  glucagon  Injectable 1 milliGRAM(s) IntraMuscular once  heparin   Injectable 5000 Unit(s) SubCutaneous every 12 hours  insulin glargine Injectable (LANTUS) 20 Unit(s) SubCutaneous at bedtime  insulin lispro (ADMELOG) corrective regimen sliding scale   SubCutaneous three times a day before meals  insulin lispro Injectable (ADMELOG) 3 Unit(s) SubCutaneous three times a day before meals  meropenem  IVPB 1000 milliGRAM(s) IV Intermittent every 8 hours  sodium chloride 0.9%. 1000 milliLiter(s) (100 mL/Hr) IV Continuous <Continuous>    MEDICATIONS  (PRN):  acetaminophen   Tablet .. 325 milliGRAM(s) Oral every 4 hours PRN Temp greater or equal to 38C (100.4F), Mild Pain (1 - 3)  HYDROmorphone  Injectable 0.5 milliGRAM(s) IV Push every 6 hours PRN Severe Pain (7 - 10)  morphine  - Injectable 2 milliGRAM(s) IV Push every 4 hours PRN Moderate Pain (4 - 6)        I&O's Summary      PHYSICAL EXAM:  Vital Signs Last 24 Hrs  T(C): 37.2 (17 Jun 2021 19:19), Max: 38.1 (17 Jun 2021 16:08)  T(F): 98.9 (17 Jun 2021 19:19), Max: 100.5 (17 Jun 2021 16:08)  HR: 80 (17 Jun 2021 19:19) (80 - 95)  BP: 137/83 (17 Jun 2021 19:19) (137/83 - 152/93)  BP(mean): --  RR: 18 (17 Jun 2021 19:19) (18 - 20)  SpO2: 97% (17 Jun 2021 19:19) (93% - 97%)        CONSTITUTIONAL: NAD, well-developed, well-groomed  ENMT: Swelling over left mandible, Warm and tender to palpation, no drainage noted   RESPIRATORY: Normal respiratory effort; lungs are clear to auscultation bilaterally  CARDIOVASCULAR: Regular rate and rhythm, normal S1 and S2, No lower extremity edema  ABDOMEN: Nontender to palpation, normoactive bowel sounds  MUSCULOSKELETAL:  No clubbing or cyanosis of digits  PSYCH: A+O to person, place, and time; affect appropriate  NEUROLOGY: No gross sensory or motor deficits   SKIN: No rashes; no palpable lesions    LABS:                        14.4   10.71 )-----------( 213      ( 17 Jun 2021 07:33 )             42.1     06-17    133<L>  |  94<L>  |  10.3  ----------------------------<  224<H>  4.0   |  30.0<H>  |  0.96    Ca    9.1      17 Jun 2021 07:33    TPro  7.5  /  Alb  4.0  /  TBili  0.3<L>  /  DBili  x   /  AST  10  /  ALT  17  /  AlkPhos  74  06-16              CAPILLARY BLOOD GLUCOSE      POCT Blood Glucose.: 181 mg/dL (17 Jun 2021 21:17)  POCT Blood Glucose.: 185 mg/dL (17 Jun 2021 17:12)  POCT Blood Glucose.: 233 mg/dL (17 Jun 2021 10:44)  POCT Blood Glucose.: 225 mg/dL (17 Jun 2021 07:45)

## 2021-06-17 NOTE — PROGRESS NOTE ADULT - SUBJECTIVE AND OBJECTIVE BOX
ENT Follow up Note    Patient seen and Examined.  Reports some decrease in pain since yesterday,  Swelling unchanged as per patient.  CT Neck / Max/Face reviewed.  Marked Parotitis with Reactive LAD.  No abscess.  No subcutaneous air noted.    Exam:  Low Grade Fever to 110.4.  Vitals Otherwise Stable  Gen: Sitting up in Bed, breathing quietly and comf.  Eating without issue.  No distress  Ears: EAC clear  Nose: clear anteriorly  Oc/Op: mild trismus, no pus from Stensons ducts b/l.  Tongue and floor of mouth soft.  Oropharynx clear and without edema  Face/Neck: diffuse swelling of left Parotid, Extending from preauricular area to lower periorbital region.  Moderately tender, overlying erythema, no fluctuance.  Also extending to submandibular skin    Labs Reviewed  WBC trending downward from 15 to 10

## 2021-06-17 NOTE — PROGRESS NOTE ADULT - ASSESSMENT
A/P: 52 y/o M with DM admitted with worsening Parotitis and significant overlying cellulitis. No evidence of abscess formation of SubQ Air.  WBC trending down today.  Minimal subjective improvement  -ID recs appreciated. Cont IV antibiotics  -Strict Glucose control  -Continue Warm Compresses, Sialogogues.  Parotid Massage (Posterior to anterior).  IV Hydration  -ENT following

## 2021-06-17 NOTE — CONSULT NOTE ADULT - SUBJECTIVE AND OBJECTIVE BOX
[facility]  INFECTIOUS DISEASES AND INTERNAL MEDICINE at [place]  =======================================================  Home Raymundo MD  Diplomates American Board of Internal Medicine and Infectious Diseases  Tel  [phone]  Fax [phone]  =======================================================    [id number]  HEMANT HOODLUCRETIA   HPI:  This 51 year old male with DM (has refused medications, attempting to diet control) presents with increasing pain over left jaw. Reports that initially started as a lump at angle of jaw last Tuesday. Swelling continued and was associated with increasing pain in the region for which he presented to the ER and was prescribed Augmentin and referred to ENT. Was advised by ED to stat warm compresses nd abx changed to clindamycin. States that had very mild improvement in swelling and pain transiently for a day, however last Monday has started increasing in size again Now extremely painful.   Subjectively reports feeling warm with possible rigors.   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   No abdominal pain, changes in gastrointestinal or genitourinary habits, no testicular pain or swelling.   (16 Jun 2021 13:08)    patient denies facial trauma.  was seen in ER prior to this, given antibiotics and sucking candy w/o improvement.   patient started on IV Zosyn by medical team.     I have personally reviewed the labs and data; pertinent labs and data are listed in this note; please see below.   =======================================================  Past Medical & Surgical Hx:  =====================  PAST MEDICAL & SURGICAL HISTORY:  Diabetes mellitus    No significant past surgical history      Problem List:  ==========  HEALTH ISSUES - PROBLEM Dx:    Social Hx:  =======  no toxic habits currently    FAMILY HISTORY:  FHx: diabetes mellitus (Grandparent)    no significant family history of immunosuppressive disorders in mother or father   =======================================================    REVIEW OF SYSTEMS:  CONSTITUTIONAL:  No Fever or chills  HEENT:  as per HPI  CARDIOVASCULAR:  No pressure, squeezing, strangling, tightness, heaviness or aching about the chest, neck, axilla or epigastrium.  RESPIRATORY:  No cough, shortness of breath  GASTROINTESTINAL:  No nausea, vomiting or diarrhea.  GENITOURINARY:  No dysuria, frequency or urgency. No Blood in urine  MUSCULOSKELETAL:  no joint aches, no muscle pain  SKIN:  No change in skin, hair or nails.  NEUROLOGIC:  No Headaches, seizures or weakness.  PSYCHIATRIC:  No disorder of thought or mood.  ENDOCRINE:  No heat or cold intolerance  HEMATOLOGICAL:  No easy bruising or bleeding.    =======================================================  Allergies  No Known Allergies    Antibiotics:  piperacillin/tazobactam IVPB.. 3.375 Gram(s) IV Intermittent every 8 hours    Other medications:  dextrose 40% Gel 15 Gram(s) Oral once  dextrose 5%. 1000 milliLiter(s) IV Continuous <Continuous>  dextrose 5%. 1000 milliLiter(s) IV Continuous <Continuous>  dextrose 50% Injectable 25 Gram(s) IV Push once  dextrose 50% Injectable 12.5 Gram(s) IV Push once  dextrose 50% Injectable 25 Gram(s) IV Push once  glucagon  Injectable 1 milliGRAM(s) IntraMuscular once  heparin   Injectable 5000 Unit(s) SubCutaneous every 12 hours  insulin lispro (ADMELOG) corrective regimen sliding scale   SubCutaneous three times a day before meals    amoxicillin  875 milliGRAM(s)/clavulanate   1 Tablet(s) Oral (06-13-21 @ 02:14)    clindamycin IVPB   100 mL/Hr IV Intermittent (06-16-21 @ 07:02)    piperacillin/tazobactam IVPB.   200 mL/Hr IV Intermittent (06-16-21 @ 13:54)      ======================================================  Physical Exam:  ============  T(F): 98.2 (16 Jun 2021 14:01), Max: 100.6 (16 Jun 2021 08:05)  HR: 75 (16 Jun 2021 14:01)  BP: 125/72 (16 Jun 2021 14:01)  RR: 16 (16 Jun 2021 14:01)  SpO2: 95% (16 Jun 2021 14:01) (95% - 97%)  temp max in last 48H T(F): , Max: 100.6 (06-16-21 @ 08:05)Height (cm): 170.2 (06-16-21 @ 04:16)  Weight (kg): 83 (06-16-21 @ 04:16)  BMI (kg/m2): 28.7 (06-16-21 @ 04:16)  BSA (m2): 1.95 (06-16-21 @ 04:16)    General:  No acute distress.  OBESE  Eye: Pupils are equal, round and reactive to light, Extraocular movements are intact, Normal conjunctiva.  HENT: Normocephalic, Oral mucosa is moist, No pharyngeal erythema, No sinus tenderness.  enlarged swollen left side of face with erythema.   FAIR Dentition  Neck: Supple, No lymphadenopathy.  Respiratory: Lungs are clear to auscultation, Respirations are non-labored.  Cardiovascular: Normal rate, Regular rhythm,   Gastrointestinal: Soft, Non-tender, Non-distended, Normal bowel sounds.  Genitourinary: No costovertebral angle tenderness.  Lymphatics: No lymphadenopathy neck,   Musculoskeletal: Normal range of motion, Normal strength.  Integumentary: No rash.  Neurologic: Alert, Oriented, No focal deficits, Cranial Nerves II-XII are grossly intact.  Psychiatric: Appropriate mood & affect.    =======================================================  Labs:                        14.8   15.70 )-----------( 233      ( 16 Jun 2021 05:43 )             44.4     WBC Count: 15.70 K/uL (06-16-21 @ 05:43)  WBC Count: 12.05 K/uL (06-12-21 @ 23:46)      06-16    137  |  98  |  17.7  ----------------------------<  267<H>  3.6   |  27.0  |  1.00    Ca    9.3      16 Jun 2021 05:43    TPro  7.5  /  Alb  4.0  /  TBili  0.3<L>  /  DBili  x   /  AST  10  /  ALT  17  /  AlkPhos  74  06-16      Culture - Blood (collected 06-12-21 @ 23:48)  Source: .Blood Blood    Culture - Blood (collected 06-12-21 @ 23:48)  Source: .Blood Blood      Creatinine, Serum: 1.00 mg/dL (06-16-21 @ 05:43)  Creatinine, Serum: 1.05 mg/dL (06-12-21 @ 23:46)    < from: CT Neck Soft Tissue w/ IV Cont (06.13.21 @ 01:14) >     EXAM:  CT NECK SOFT TISSUE IC                         EXAM:  CT MAXILLOFACIAL  IC                          PROCEDURE DATE:  06/13/2021          INTERPRETATION:  CT NECK WITH CONTRAST    CLINICAL INDICATION: Left lateral neck swelling and fever.    TECHNIQUE: Contrast enhanced CT of the neck and maxillofacial bones.    97 mL of Omnipaque 300 contrast material was injected IV. 3 mL was discarded. Coronal and sagittal reformats were obtained.    COMPARISON: None.    FINDINGS:    Skull base: No abnormality in the visualized portions.    Orbits: Unremarkable.    Paranasal sinuses: Minimal right sphenoid sinus mucosal thickening. Remainder of the paranasal sinuses are clear.    Vascular structures: No thrombosis or critical stenosis.    Lymph nodes: Numerous enlarged left cervical chain nodes, which may be reactive. For example a 2.0 x 1.2 cm left level 2A node (3:94).    Suprahyoid neck: Nonspecific mild asymmetric enlargement and hyperemia of the left parotid gland. Infiltration of the subcutaneous fat overlying the left lateral face extending to the submandibular region with associated thickening of the platysma musculature. No discrete salivary gland calculus or ductal dilatation. Submandibular glands are symmetric in size. Normal nasopharynx, oropharynx and retropharyngeal space. Nonspecific partial effacement of the left performed sinus an limited evaluation of the left aryepiglottic fold.    Intrahyoid neck: Normal.    Thyroid: Unremarkable.    Lung apices: Clear.    Spine: Nosuspicious lesions.    IMPRESSION:    Mild asymmetric enlargement and hyperemia of the left parotid gland. No discrete mass lesion. Infiltration of the subcutaneous fat of the left lateral face extending to the submandibular region with thickening ofthe ipsilateral platysma musculature. Findings may be seen in the setting of a nonspecific parotiditis.    Left cervical chain lymphadenopathy, which may be reactive.    Clinical follow-up to resolution is recommended.    Nonspecific asymmetric effacement of the left piriform sinus with suboptimal evaluation of the left aryepiglottic fold, which may be related to apposition of mucosal surfaces. Direct visualization may be obtained as clinically warranted.         YIMI NUNEZ MD; Attending Radiologist  This document has been electronically signed. Jun 13 2021  1:54AM    < end of copied text >    
51 man with left sided parotitis seen by member of our practice two days ago for parotid swelling after being seen initially in the ER. he ha da cT that did not show any abscess but he did not improve with change of oral antibiotics. he is non compliant diabetic..  he was told to come to the ER if he did not improve and he did jus tthat earlier todauy.  he has been on IV abx since 3am and feels no change for the better or for the worse.    exam shows profound swelling of the left parotid gland with swelling that extends from the pre auricular area down to the submandibular region and edging toward the midline.    he is alert and in no distress    there is no obvious fluctuance    there is no obvious purulence from the scarlett duct.    US on admission did not demonsttrate any abscess.    i am concerned about colby extent of the present infection.    i understand the hesitency to give more contrast however i think that a CT with IV contrast is necessary in this circumstance to be sure that there is no need for surgical intervention    for ow please continue the IV abx and i will discuss the need for CT with the hospitalist staff
HPI:     Briefly, pt is a 51 year old male with DM (has refused medications, attempting to diet control) presents with increasing pain over left jaw. Reports that initially started as a lump at angle of jaw last Tuesday. Swelling continued and was associated with increasing pain in the region for which he presented to the ER and was prescribed Augmentin and referred to ENT. Was advised by ED to stat warm compresses nd abx changed to clindamycin. States that had very mild improvement in swelling and pain transiently for a day, however last Monday has started increasing in size again Now extremely painful.    Subjectively reports feeling warm with possible rigors.    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    No abdominal pain, changes in gastrointestinal or genitourinary habits, no testicular pain or swelling.   (16 Jun 2021 13:08)      PAST MEDICAL & SURGICAL HISTORY:  Diabetes mellitus    No significant past surgical history        FAMILY HISTORY:  FHx: diabetes mellitus (Grandparent)        SOCIAL HISTORY:    REVIEW OF SYSTEMS:  as noted in HPI    MEDICATIONS  (STANDING):  clindamycin IVPB 900 milliGRAM(s) IV Intermittent every 8 hours  dextrose 40% Gel 15 Gram(s) Oral once  dextrose 5%. 1000 milliLiter(s) (50 mL/Hr) IV Continuous <Continuous>  dextrose 5%. 1000 milliLiter(s) (100 mL/Hr) IV Continuous <Continuous>  dextrose 50% Injectable 25 Gram(s) IV Push once  dextrose 50% Injectable 12.5 Gram(s) IV Push once  dextrose 50% Injectable 25 Gram(s) IV Push once  glucagon  Injectable 1 milliGRAM(s) IntraMuscular once  heparin   Injectable 5000 Unit(s) SubCutaneous every 12 hours  insulin glargine Injectable (LANTUS) 10 Unit(s) SubCutaneous at bedtime  insulin lispro (ADMELOG) corrective regimen sliding scale   SubCutaneous three times a day before meals  insulin lispro Injectable (ADMELOG) 3 Unit(s) SubCutaneous three times a day before meals  meropenem  IVPB 1000 milliGRAM(s) IV Intermittent every 8 hours  sodium chloride 0.9%. 1000 milliLiter(s) (100 mL/Hr) IV Continuous <Continuous>    MEDICATIONS  (PRN):  acetaminophen   Tablet .. 325 milliGRAM(s) Oral every 4 hours PRN Temp greater or equal to 38C (100.4F), Mild Pain (1 - 3)  HYDROmorphone  Injectable 0.5 milliGRAM(s) IV Push every 6 hours PRN Severe Pain (7 - 10)  morphine  - Injectable 2 milliGRAM(s) IV Push every 4 hours PRN Moderate Pain (4 - 6)      Allergies    No Known Allergies    Intolerances          PHYSICAL EXAM:    Vital Signs Last 24 Hrs  T(C): 38.1 (17 Jun 2021 16:08), Max: 38.1 (17 Jun 2021 16:08)  T(F): 100.5 (17 Jun 2021 16:08), Max: 100.5 (17 Jun 2021 16:08)  HR: 92 (17 Jun 2021 16:08) (88 - 95)  BP: 151/78 (17 Jun 2021 16:08) (140/82 - 152/93)  BP(mean): --  RR: 20 (17 Jun 2021 16:08) (18 - 20)  SpO2: 93% (17 Jun 2021 16:08) (93% - 95%)    General appearance: Well developed, well nourished.    Eyes: Pupils equal  EOM full. No exophthalmos.    Neck: Trachea midline. No thyroid enlargement. Erythema and swelling left facial area    Lungs: Normal respiratory excursion. Lungs clear.    CV: Regular cardiac rhythm. No murmur.     Abdomen: Soft, non tender, no organomegaly or mass.    Musculoskeletal: No cyanosis, clubbing, or edema.    Skin: Warm and dry    Neuro: Cranial nerves intact. Normal motor function.     Psych: Normal affect, good judgement.            LABS:                        14.4   10.71 )-----------( 213      ( 17 Jun 2021 07:33 )             42.1     06-17    133<L>  |  94<L>  |  10.3  ----------------------------<  224<H>  4.0   |  30.0<H>  |  0.96    Ca    9.1      17 Jun 2021 07:33    TPro  7.5  /  Alb  4.0  /  TBili  0.3<L>  /  DBili  x   /  AST  10  /  ALT  17  /  AlkPhos  74  06-16      LIVER FUNCTIONS - ( 16 Jun 2021 05:43 )  Alb: 4.0 g/dL / Pro: 7.5 g/dL / ALK PHOS: 74 U/L / ALT: 17 U/L / AST: 10 U/L / GGT: x                 POCT Blood Glucose.: 233 mg/dL (06-17-21 @ 10:44)  POCT Blood Glucose.: 225 mg/dL (06-17-21 @ 07:45)  POCT Blood Glucose.: 325 mg/dL (06-16-21 @ 17:21)    a1c 7.8

## 2021-06-17 NOTE — PROGRESS NOTE ADULT - ASSESSMENT
This 51 year old male with DM (has refused medications, attempting to diet control) presents with increasing pain over left jaw. Reports that initially started as a lump at angle of jaw last Tuesday. Swelling continued and was associated with increasing pain in the region for which he presented to the ER and was prescribed Augmentin and referred to ENT. Was advised by ED to stat warm compresses nd abx changed to clindamycin. States that had very mild improvement in swelling and pain transiently for a day, however last Monday has started increasing in size again Now extremely painful.   Subjectively reports feeling warm with possible rigors.   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   No abdominal pain, changes in gastrointestinal or genitourinary habits, no testicular pain or swelling.   (16 Jun 2021 13:08)    patient denies facial trauma.  was seen in ER prior to this, given antibiotics and sucking candy w/o improvement.   patient started on IV Zosyn by medical team.       Impression:  parotiditis  WBC elevation  diabetes      Plan:  has been on zosyn and WBC coming down.   however, - Still with swelling of face    Worsening sialoadenitis which can be infectious or inflammatory in etiology.  Stable left cervical adenopathy, presumably reactive.    - Will change zosyn to MERREM 1 gram q8H, ALSO ADDED Clindamycin 900mg IV Q8H x 9 doses to decrease inflammation.       - lemon candy ATC  - follow up all outstanding cultures  - trend temperature and WBC curve  - repeat cultures from blood and all sources if febrile.    WBC elevation is reactive  - much improved  - will follow and trend      Regarding Diabetes  - needs adequate control for wound healing  - suboptimal control: A1C with Estimated Average Glucose Result: 7.8 % (06-17-21 @ 07:33)  - suggest input from ENDOCRINE

## 2021-06-17 NOTE — PROGRESS NOTE ADULT - ASSESSMENT
51 M with uncontrolled DM presenting with worsening parotitis.     Severe parotitis   - CT Maxillofacial: Severe edematous enlargement of the left parotid gland and left parotid tail, compatible with a severe acute parotitis, mildly worsened since prior exam. There is no focal drainable fluid collection or abscess present. No parotid calculi are present. There are subcutaneous edematous changes within the subcutaneous tissues. There are numerous enlarged enhancing lymph nodes throughout the bilateral neck region, left greater than right, compatible with marked reactive lymphadenopathy.  - CT Neck: Worsening sialoadenitis which can be infectious or inflammatory in etiology. Stable left cervical adenopathy, presumably reactive.  - Await Blood cultures x 2  - IV Zosyn changed to IV Merrem and Clindamycin   - Will switch Morphine to Dilaudid for pain   - Appreciate ENT and ID consults     DM uncontrolled   - A1c 7.8 Pt stats has refused medications in past, has tried diet control  - Start Lantus and Lispro  - Endo consult     DVT ppx: Heparin s/c Q 12, pt ambulatory

## 2021-06-17 NOTE — CONSULT NOTE ADULT - ASSESSMENT
This 51 year old male with DM (has refused medications, attempting to diet control) presents with increasing pain over left jaw. Reports that initially started as a lump at angle of jaw last Tuesday. Swelling continued and was associated with increasing pain in the region for which he presented to the ER and was prescribed Augmentin and referred to ENT. Was advised by ED to stat warm compresses nd abx changed to clindamycin. States that had very mild improvement in swelling and pain transiently for a day, however last Monday has started increasing in size again Now extremely painful.   Subjectively reports feeling warm with possible rigors.   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   No abdominal pain, changes in gastrointestinal or genitourinary habits, no testicular pain or swelling.   (16 Jun 2021 13:08)    patient denies facial trauma.  was seen in ER prior to this, given antibiotics and sucking candy w/o improvement.   patient started on IV Zosyn by medical team.       Impression:  parotiditis  WBC elevation  diabetes      Plan:  - continue zosyn  - lemon candy ATC  - follow up all outstanding cultures  - trend temperature and WBC curve  - repeat cultures from blood and all sources if febrile.      WBC elevation is reactive  - will follow and trend      Regarding Diabetes  - needs adequate control for wound healing  - A1c ordered for tomorrow              
DM type 2, in suboptimal control, complicated by parotitis. Insulin treatment needed acutely, will need to be in higher amounts due to anticipated insulin resistance. Diabetic teaching needed. Orders revised

## 2021-06-17 NOTE — PROGRESS NOTE ADULT - SUBJECTIVE AND OBJECTIVE BOX
[facility]  INFECTIOUS DISEASES AND INTERNAL MEDICINE at [place]  =======================================================  Home Zuluaga MD [facility]   Minh Raymundo MD  Diplomates American Board of Internal Medicine and Infectious Diseases  Tel  [phone]  Fax [phone]  =======================================================    MRN-[id number]  HEMANT HASPER   follow up left partotitis    still with tenderness and swelling of left face  reports difficulty closing his mouth now.     ENT note appreciated.   repeat CT scan appreciated.          I have personally reviewed the labs and data; pertinent labs and data are listed in this note; please see below.   =======================================================  Past Medical & Surgical Hx:  =====================  PAST MEDICAL & SURGICAL HISTORY:  Diabetes mellitus    No significant past surgical history      Problem List:  ==========  HEALTH ISSUES - PROBLEM Dx:    Social Hx:  =======  no toxic habits currently    FAMILY HISTORY:  FHx: diabetes mellitus (Grandparent)    no significant family history of immunosuppressive disorders in mother or father   =======================================================    REVIEW OF SYSTEMS:  CONSTITUTIONAL:  No Fever or chills  HEENT:  as per HPI  CARDIOVASCULAR:  No pressure, squeezing, strangling, tightness, heaviness or aching about the chest, neck, axilla or epigastrium.  RESPIRATORY:  No cough, shortness of breath  GASTROINTESTINAL:  No nausea, vomiting or diarrhea.  GENITOURINARY:  No dysuria, frequency or urgency. No Blood in urine  MUSCULOSKELETAL:  no joint aches, no muscle pain  SKIN:  No change in skin, hair or nails.  NEUROLOGIC:  No Headaches, seizures or weakness.  PSYCHIATRIC:  No disorder of thought or mood.  ENDOCRINE:  No heat or cold intolerance  HEMATOLOGICAL:  No easy bruising or bleeding.    =======================================================  Allergies  No Known Allergies         ======================================================  Physical Exam:  ============       General:  No acute distress.  OBESE  Eye: Pupils are equal, round and reactive to light, Extraocular movements are intact, Normal conjunctiva.  HENT: Normocephalic, Oral mucosa is moist, No pharyngeal erythema, No sinus tenderness.  enlarged swollen left side of face with erythema, and tenderness.,   SWELLING OF THE LOWER LEFT Eyelid  FAIR Dentition  Neck: Supple, No lymphadenopathy in axillae  Respiratory: Lungs are clear to auscultation, Respirations are non-labored.  Cardiovascular: Normal rate, Regular rhythm,   Gastrointestinal: Soft, Non-tender, Non-distended, Normal bowel sounds.  Genitourinary: No costovertebral angle tenderness.  Lymphatics: No lymphadenopathy neck,   Musculoskeletal: Normal range of motion, Normal strength.  Integumentary: No rash.  Neurologic: Alert, Oriented, No focal deficits, Cranial Nerves II-XII are grossly intact.  Psychiatric: Appropriate mood & affect.    =======================================================     Vitals:  ============  T(F): 98.9 (17 Jun 2021 07:44), Max: 99.2 (17 Jun 2021 02:20)  HR: 91 (17 Jun 2021 07:44)  BP: 145/85 (17 Jun 2021 07:44)  RR: 18 (17 Jun 2021 07:44)  SpO2: 94% (17 Jun 2021 07:44) (93% - 95%)  temp max in last 48H T(F): , Max: 100.6 (06-16-21 @ 08:05)    =======================================================  Current Antibiotics:  clindamycin IVPB 900 milliGRAM(s) IV Intermittent every 8 hours  meropenem  IVPB 1000 milliGRAM(s) IV Intermittent every 8 hours    Other medications:  dextrose 40% Gel 15 Gram(s) Oral once  dextrose 5%. 1000 milliLiter(s) IV Continuous <Continuous>  dextrose 5%. 1000 milliLiter(s) IV Continuous <Continuous>  dextrose 50% Injectable 25 Gram(s) IV Push once  dextrose 50% Injectable 12.5 Gram(s) IV Push once  dextrose 50% Injectable 25 Gram(s) IV Push once  glucagon  Injectable 1 milliGRAM(s) IntraMuscular once  heparin   Injectable 5000 Unit(s) SubCutaneous every 12 hours  insulin glargine Injectable (LANTUS) 10 Unit(s) SubCutaneous at bedtime  insulin lispro (ADMELOG) corrective regimen sliding scale   SubCutaneous three times a day before meals  insulin lispro Injectable (ADMELOG) 3 Unit(s) SubCutaneous three times a day before meals  sodium chloride 0.9%. 1000 milliLiter(s) IV Continuous <Continuous>      =======================================================  Labs:                        14.4   10.71 )-----------( 213      ( 17 Jun 2021 07:33 )             42.1     WBC Count: 10.71 K/uL (06-17-21 @ 07:33)  WBC Count: 15.70 K/uL (06-16-21 @ 05:43)  WBC Count: 12.05 K/uL (06-12-21 @ 23:46)        06-17    133<L>  |  94<L>  |  10.3  ----------------------------<  224<H>  4.0   |  30.0<H>  |  0.96    Ca    9.1      17 Jun 2021 07:33    TPro  7.5  /  Alb  4.0  /  TBili  0.3<L>  /  DBili  x   /  AST  10  /  ALT  17  /  AlkPhos  74  06-16      Culture - Blood (collected 06-12-21 @ 23:48)  Source: .Blood Blood    Culture - Blood (collected 06-12-21 @ 23:48)  Source: .Blood Blood         COVID-19 PCR: NotDetec (06-16-21 @ 12:35)        < from: CT Neck Soft Tissue w/ IV Cont (06.16.21 @ 21:39) >     EXAM:  CT NECK SOFT TISSUE IC                          PROCEDURE DATE:  06/16/2021          INTERPRETATION:  CLINICAL INFORMATION: 50 yo male no PMHx presents to ED c/o left sided jaw pain x4 days. Patient presented 6/12, diagnosed with parotitis.Presented to ENT specialist next day, abx changed from Augmentin to Clindamycin. Patient states pain and swelling is worsening. Difficulty opening mouth. Last medicated with either ibuprofen or acetaminophen 2 hours PTA (unsure which). No further complaints at this time. Denies fever, difficulty breathing.    TECHNIQUE: CT soft tissue neck.  Transaxial images were acquired from the skull base through to the aortic arch at the ministration of IV contrast. Coronal and sagittal reformations were also obtained.  98 mls of Omnipaque 300 were administered intravenously without untoward event. 2 mls discarded.    COMPARISON: None.    FINDINGS:  There is increased inflammatory-type change in the left face and neck subcutaneous fat as well as new inflammatory change in the right lower neck subcutaneous fat. Increased inflammatory changes also seen in the left submandibular fat and now within the right submandibular fat. There is reactive thickening of the platysma muscles, increased on the left and new on the right. This appears to be related to worsening inflammation of the left parotid gland and new inflammatory changes in the submandibular glands. No sialolith or ductal dilatation. The right parotid gland is unremarkable. There is reactive edema with enlargement of the left masseter muscle.    There is new mild reactive edema in the left pharyngeal wall. The aerodigestive tract is otherwise unremarkable without masslike asymmetry or nodularity.    Evaluation of the various lesvia stations again demonstrate mildly enlarged left level 2A and B lymph nodes, the largest being a left to a jugulodigastric node measuring up to 2.1 cm.    The thyroid gland is unremarkable.    The carotid and vertebral arteries are patent.    The visualized intracranial and intraorbital compartments are unremarkable.    There is no lucent or sclerotic lesion.    The visualized lung apices are clear.    IMPRESSION:  Worsening sialoadenitis which can be infectious or inflammatory in etiology.  Stable left cervical adenopathy, presumably reactive.         POONAM GARCIA MD; Attending Radiologist  This document has been electronically signed. Jun 17 2021  2:57AM    < end of copied text >

## 2021-06-18 ENCOUNTER — TRANSCRIPTION ENCOUNTER (OUTPATIENT)
Age: 51
End: 2021-06-18

## 2021-06-18 LAB
ANION GAP SERPL CALC-SCNC: 9 MMOL/L — SIGNIFICANT CHANGE UP (ref 5–17)
BASOPHILS # BLD AUTO: 0.04 K/UL — SIGNIFICANT CHANGE UP (ref 0–0.2)
BASOPHILS NFR BLD AUTO: 0.4 % — SIGNIFICANT CHANGE UP (ref 0–2)
BUN SERPL-MCNC: 10.4 MG/DL — SIGNIFICANT CHANGE UP (ref 8–20)
CALCIUM SERPL-MCNC: 8.2 MG/DL — LOW (ref 8.6–10.2)
CHLORIDE SERPL-SCNC: 97 MMOL/L — LOW (ref 98–107)
CO2 SERPL-SCNC: 27 MMOL/L — SIGNIFICANT CHANGE UP (ref 22–29)
CREAT SERPL-MCNC: 0.81 MG/DL — SIGNIFICANT CHANGE UP (ref 0.5–1.3)
CULTURE RESULTS: SIGNIFICANT CHANGE UP
CULTURE RESULTS: SIGNIFICANT CHANGE UP
EOSINOPHIL # BLD AUTO: 0.22 K/UL — SIGNIFICANT CHANGE UP (ref 0–0.5)
EOSINOPHIL NFR BLD AUTO: 2.4 % — SIGNIFICANT CHANGE UP (ref 0–6)
GLUCOSE BLDC GLUCOMTR-MCNC: 157 MG/DL — HIGH (ref 70–99)
GLUCOSE BLDC GLUCOMTR-MCNC: 161 MG/DL — HIGH (ref 70–99)
GLUCOSE BLDC GLUCOMTR-MCNC: 171 MG/DL — HIGH (ref 70–99)
GLUCOSE BLDC GLUCOMTR-MCNC: 223 MG/DL — HIGH (ref 70–99)
GLUCOSE SERPL-MCNC: 167 MG/DL — HIGH (ref 70–99)
HCT VFR BLD CALC: 39.3 % — SIGNIFICANT CHANGE UP (ref 39–50)
HGB BLD-MCNC: 13.1 G/DL — SIGNIFICANT CHANGE UP (ref 13–17)
IMM GRANULOCYTES NFR BLD AUTO: 1.6 % — HIGH (ref 0–1.5)
LYMPHOCYTES # BLD AUTO: 0.93 K/UL — LOW (ref 1–3.3)
LYMPHOCYTES # BLD AUTO: 10.2 % — LOW (ref 13–44)
MCHC RBC-ENTMCNC: 30.1 PG — SIGNIFICANT CHANGE UP (ref 27–34)
MCHC RBC-ENTMCNC: 33.3 GM/DL — SIGNIFICANT CHANGE UP (ref 32–36)
MCV RBC AUTO: 90.3 FL — SIGNIFICANT CHANGE UP (ref 80–100)
MONOCYTES # BLD AUTO: 0.84 K/UL — SIGNIFICANT CHANGE UP (ref 0–0.9)
MONOCYTES NFR BLD AUTO: 9.2 % — SIGNIFICANT CHANGE UP (ref 2–14)
NEUTROPHILS # BLD AUTO: 6.92 K/UL — SIGNIFICANT CHANGE UP (ref 1.8–7.4)
NEUTROPHILS NFR BLD AUTO: 76.2 % — SIGNIFICANT CHANGE UP (ref 43–77)
PLATELET # BLD AUTO: 199 K/UL — SIGNIFICANT CHANGE UP (ref 150–400)
POTASSIUM SERPL-MCNC: 3.8 MMOL/L — SIGNIFICANT CHANGE UP (ref 3.5–5.3)
POTASSIUM SERPL-SCNC: 3.8 MMOL/L — SIGNIFICANT CHANGE UP (ref 3.5–5.3)
RBC # BLD: 4.35 M/UL — SIGNIFICANT CHANGE UP (ref 4.2–5.8)
RBC # FLD: 12.4 % — SIGNIFICANT CHANGE UP (ref 10.3–14.5)
SODIUM SERPL-SCNC: 133 MMOL/L — LOW (ref 135–145)
SPECIMEN SOURCE: SIGNIFICANT CHANGE UP
SPECIMEN SOURCE: SIGNIFICANT CHANGE UP
WBC # BLD: 9.1 K/UL — SIGNIFICANT CHANGE UP (ref 3.8–10.5)
WBC # FLD AUTO: 9.1 K/UL — SIGNIFICANT CHANGE UP (ref 3.8–10.5)

## 2021-06-18 PROCEDURE — 99233 SBSQ HOSP IP/OBS HIGH 50: CPT

## 2021-06-18 PROCEDURE — 99232 SBSQ HOSP IP/OBS MODERATE 35: CPT

## 2021-06-18 RX ORDER — SACCHAROMYCES BOULARDII 250 MG
250 POWDER IN PACKET (EA) ORAL
Refills: 0 | Status: DISCONTINUED | OUTPATIENT
Start: 2021-06-18 | End: 2021-06-23

## 2021-06-18 RX ADMIN — HEPARIN SODIUM 5000 UNIT(S): 5000 INJECTION INTRAVENOUS; SUBCUTANEOUS at 05:30

## 2021-06-18 RX ADMIN — MORPHINE SULFATE 2 MILLIGRAM(S): 50 CAPSULE, EXTENDED RELEASE ORAL at 11:27

## 2021-06-18 RX ADMIN — Medication 100 MILLIGRAM(S): at 05:30

## 2021-06-18 RX ADMIN — MORPHINE SULFATE 2 MILLIGRAM(S): 50 CAPSULE, EXTENDED RELEASE ORAL at 05:39

## 2021-06-18 RX ADMIN — MEROPENEM 100 MILLIGRAM(S): 1 INJECTION INTRAVENOUS at 21:35

## 2021-06-18 RX ADMIN — Medication 250 MILLIGRAM(S): at 16:57

## 2021-06-18 RX ADMIN — HYDROMORPHONE HYDROCHLORIDE 0.5 MILLIGRAM(S): 2 INJECTION INTRAMUSCULAR; INTRAVENOUS; SUBCUTANEOUS at 21:57

## 2021-06-18 RX ADMIN — SODIUM CHLORIDE 100 MILLILITER(S): 9 INJECTION INTRAMUSCULAR; INTRAVENOUS; SUBCUTANEOUS at 05:29

## 2021-06-18 RX ADMIN — SODIUM CHLORIDE 100 MILLILITER(S): 9 INJECTION INTRAMUSCULAR; INTRAVENOUS; SUBCUTANEOUS at 21:42

## 2021-06-18 RX ADMIN — Medication 1: at 08:10

## 2021-06-18 RX ADMIN — HYDROMORPHONE HYDROCHLORIDE 0.5 MILLIGRAM(S): 2 INJECTION INTRAMUSCULAR; INTRAVENOUS; SUBCUTANEOUS at 14:35

## 2021-06-18 RX ADMIN — HYDROMORPHONE HYDROCHLORIDE 0.5 MILLIGRAM(S): 2 INJECTION INTRAMUSCULAR; INTRAVENOUS; SUBCUTANEOUS at 14:50

## 2021-06-18 RX ADMIN — Medication 100 MILLIGRAM(S): at 21:36

## 2021-06-18 RX ADMIN — INSULIN GLARGINE 20 UNIT(S): 100 INJECTION, SOLUTION SUBCUTANEOUS at 21:36

## 2021-06-18 RX ADMIN — HYDROMORPHONE HYDROCHLORIDE 0.5 MILLIGRAM(S): 2 INJECTION INTRAMUSCULAR; INTRAVENOUS; SUBCUTANEOUS at 21:42

## 2021-06-18 RX ADMIN — MEROPENEM 100 MILLIGRAM(S): 1 INJECTION INTRAVENOUS at 05:30

## 2021-06-18 RX ADMIN — SODIUM CHLORIDE 100 MILLILITER(S): 9 INJECTION INTRAMUSCULAR; INTRAVENOUS; SUBCUTANEOUS at 14:36

## 2021-06-18 RX ADMIN — Medication 3 UNIT(S): at 16:57

## 2021-06-18 RX ADMIN — Medication 1: at 11:28

## 2021-06-18 RX ADMIN — Medication 3 UNIT(S): at 11:28

## 2021-06-18 RX ADMIN — MORPHINE SULFATE 2 MILLIGRAM(S): 50 CAPSULE, EXTENDED RELEASE ORAL at 11:43

## 2021-06-18 RX ADMIN — Medication 100 MILLIGRAM(S): at 14:11

## 2021-06-18 RX ADMIN — MEROPENEM 100 MILLIGRAM(S): 1 INJECTION INTRAVENOUS at 14:11

## 2021-06-18 RX ADMIN — HEPARIN SODIUM 5000 UNIT(S): 5000 INJECTION INTRAVENOUS; SUBCUTANEOUS at 16:57

## 2021-06-18 RX ADMIN — MORPHINE SULFATE 2 MILLIGRAM(S): 50 CAPSULE, EXTENDED RELEASE ORAL at 18:56

## 2021-06-18 RX ADMIN — MORPHINE SULFATE 2 MILLIGRAM(S): 50 CAPSULE, EXTENDED RELEASE ORAL at 19:11

## 2021-06-18 RX ADMIN — Medication 3 UNIT(S): at 08:10

## 2021-06-18 RX ADMIN — Medication 1: at 16:57

## 2021-06-18 NOTE — PROGRESS NOTE ADULT - SUBJECTIVE AND OBJECTIVE BOX
[facility]  INFECTIOUS DISEASES AND INTERNAL MEDICINE at [place]  =======================================================  Home Raymundo MD  Diplomates American Board of Internal Medicine and Infectious Diseases  Tel  [phone]  Fax [phone]  =======================================================    [id number]  [facility]   follow up left partotitis    less swelling and tenderness in the face  no fevers     I have personally reviewed the labs and data; pertinent labs and data are listed in this note; please see below.   =======================================================  Past Medical & Surgical Hx:  =====================  PAST MEDICAL & SURGICAL HISTORY:  Diabetes mellitus    No significant past surgical history      Problem List:  ==========  HEALTH ISSUES - PROBLEM Dx:    Social Hx:  =======  no toxic habits currently    FAMILY HISTORY:  FHx: diabetes mellitus (Grandparent)    no significant family history of immunosuppressive disorders in mother or father   =======================================================    REVIEW OF SYSTEMS:  CONSTITUTIONAL:  No Fever or chills  HEENT:  as per HPI  CARDIOVASCULAR:  No pressure, squeezing, strangling, tightness, heaviness or aching about the chest, neck, axilla or epigastrium.  RESPIRATORY:  No cough, shortness of breath  GASTROINTESTINAL:  No nausea, vomiting or diarrhea.  GENITOURINARY:  No dysuria, frequency or urgency. No Blood in urine  MUSCULOSKELETAL:  no joint aches, no muscle pain  SKIN:  No change in skin, hair or nails.  NEUROLOGIC:  No Headaches, seizures or weakness.  PSYCHIATRIC:  No disorder of thought or mood.  ENDOCRINE:  No heat or cold intolerance  HEMATOLOGICAL:  No easy bruising or bleeding.    =======================================================  Allergies  No Known Allergies    ======================================================  Physical Exam:  ============    General:  No acute distress.  OBESE  Eye: Pupils are equal, round and reactive to light, Extraocular movements are intact, Normal conjunctiva.  HENT: Normocephalic, Oral mucosa is moist, No pharyngeal erythema, No sinus tenderness.  enlarged swollen left side of face with erythema, and tenderness.,    FAIR Dentition  Neck: Supple, No lymphadenopathy in axillae  Respiratory: Lungs are clear to auscultation, Respirations are non-labored.  Cardiovascular: Normal rate, Regular rhythm,   Gastrointestinal: Soft, Non-tender, Non-distended, Normal bowel sounds.  Genitourinary: No costovertebral angle tenderness.  Lymphatics: No lymphadenopathy neck,   Musculoskeletal: Normal range of motion, Normal strength.  Integumentary: No rash.  Neurologic: Alert, Oriented, No focal deficits, Cranial Nerves II-XII are grossly intact.  Psychiatric: Appropriate mood & affect.    =======================================================   Vitals:  ============  T(F): 98.9 (18 Jun 2021 11:05), Max: 100.5 (17 Jun 2021 16:08)  HR: 79 (18 Jun 2021 11:05)  BP: 145/88 (18 Jun 2021 11:05)  RR: 18 (18 Jun 2021 11:05)  SpO2: 92% (18 Jun 2021 11:05) (92% - 97%)  temp max in last 48H T(F): , Max: 100.5 (06-17-21 @ 16:08)    =======================================================  Current Antibiotics:  clindamycin IVPB 900 milliGRAM(s) IV Intermittent every 8 hours  meropenem  IVPB 1000 milliGRAM(s) IV Intermittent every 8 hours    Other medications:  dextrose 40% Gel 15 Gram(s) Oral once  dextrose 5%. 1000 milliLiter(s) IV Continuous <Continuous>  dextrose 5%. 1000 milliLiter(s) IV Continuous <Continuous>  dextrose 50% Injectable 25 Gram(s) IV Push once  dextrose 50% Injectable 12.5 Gram(s) IV Push once  dextrose 50% Injectable 25 Gram(s) IV Push once  glucagon  Injectable 1 milliGRAM(s) IntraMuscular once  heparin   Injectable 5000 Unit(s) SubCutaneous every 12 hours  insulin glargine Injectable (LANTUS) 20 Unit(s) SubCutaneous at bedtime  insulin lispro (ADMELOG) corrective regimen sliding scale   SubCutaneous three times a day before meals  insulin lispro Injectable (ADMELOG) 3 Unit(s) SubCutaneous three times a day before meals  saccharomyces boulardii 250 milliGRAM(s) Oral two times a day  sodium chloride 0.9%. 1000 milliLiter(s) IV Continuous <Continuous>      =======================================================  Labs:                        13.1   9.10  )-----------( 199      ( 18 Jun 2021 07:39 )             39.3     06-18    133<L>  |  97<L>  |  10.4  ----------------------------<  167<H>  3.8   |  27.0  |  0.81    Ca    8.2<L>      18 Jun 2021 07:39        Culture - Blood (collected 06-16-21 @ 07:23)  Source: .Blood Blood    Culture - Blood (collected 06-16-21 @ 06:45)  Source: .Blood Blood    Culture - Blood (collected 06-12-21 @ 23:48)  Source: .Blood Blood  Final Report (06-18-21 @ 01:00):    No growth at 5 days.    Culture - Blood (collected 06-12-21 @ 23:48)  Source: .Blood Blood  Final Report (06-18-21 @ 01:00):    No growth at 5 days.     COVID-19 PCR: NotDetec (06-16-21 @ 12:35)       < from: CT Neck Soft Tissue w/ IV Cont (06.16.21 @ 21:39) >     EXAM:  CT NECK SOFT TISSUE IC                          PROCEDURE DATE:  06/16/2021          INTERPRETATION:  CLINICAL INFORMATION: 52 yo male no PMHx presents to ED c/o left sided jaw pain x4 days. Patient presented 6/12, diagnosed with parotitis.Presented to ENT specialist next day, abx changed from Augmentin to Clindamycin. Patient states pain and swelling is worsening. Difficulty opening mouth. Last medicated with either ibuprofen or acetaminophen 2 hours PTA (unsure which). No further complaints at this time. Denies fever, difficulty breathing.    TECHNIQUE: CT soft tissue neck.  Transaxial images were acquired from the skull base through to the aortic arch at the ministration of IV contrast. Coronal and sagittal reformations were also obtained.  98 mls of Omnipaque 300 were administered intravenously without untoward event. 2 mls discarded.    COMPARISON: None.    FINDINGS:  There is increased inflammatory-type change in the left face and neck subcutaneous fat as well as new inflammatory change in the right lower neck subcutaneous fat. Increased inflammatory changes also seen in the left submandibular fat and now within the right submandibular fat. There is reactive thickening of the platysma muscles, increased on the left and new on the right. This appears to be related to worsening inflammation of the left parotid gland and new inflammatory changes in the submandibular glands. No sialolith or ductal dilatation. The right parotid gland is unremarkable. There is reactive edema with enlargement of the left masseter muscle.    There is new mild reactive edema in the left pharyngeal wall. The aerodigestive tract is otherwise unremarkable without masslike asymmetry or nodularity.    Evaluation of the various lesvia stations again demonstrate mildly enlarged left level 2A and B lymph nodes, the largest being a left to a jugulodigastric node measuring up to 2.1 cm.    The thyroid gland is unremarkable.    The carotid and vertebral arteries are patent.    The visualized intracranial and intraorbital compartments are unremarkable.    There is no lucent or sclerotic lesion.    The visualized lung apices are clear.    IMPRESSION:  Worsening sialoadenitis which can be infectious or inflammatory in etiology.  Stable left cervical adenopathy, presumably reactive.         POONAM GARCIA MD; Attending Radiologist  This document has been electronically signed. Jun 17 2021  2:57AM    < end of copied text >

## 2021-06-18 NOTE — PROGRESS NOTE ADULT - ASSESSMENT
Glucose levels currently controlled well. COnsider adding metformin to remove reliance on prandial insulin. once outpatient transition starts. Expect that insulin use will only be temporary.

## 2021-06-18 NOTE — PROGRESS NOTE ADULT - SUBJECTIVE AND OBJECTIVE BOX
INTERVAL HPI/OVERNIGHT EVENTS: follow up of diabetes    MEDICATIONS  (STANDING):  clindamycin IVPB 900 milliGRAM(s) IV Intermittent every 8 hours  dextrose 40% Gel 15 Gram(s) Oral once  dextrose 5%. 1000 milliLiter(s) (50 mL/Hr) IV Continuous <Continuous>  dextrose 5%. 1000 milliLiter(s) (100 mL/Hr) IV Continuous <Continuous>  dextrose 50% Injectable 25 Gram(s) IV Push once  dextrose 50% Injectable 12.5 Gram(s) IV Push once  dextrose 50% Injectable 25 Gram(s) IV Push once  glucagon  Injectable 1 milliGRAM(s) IntraMuscular once  heparin   Injectable 5000 Unit(s) SubCutaneous every 12 hours  insulin glargine Injectable (LANTUS) 20 Unit(s) SubCutaneous at bedtime  insulin lispro (ADMELOG) corrective regimen sliding scale   SubCutaneous three times a day before meals  insulin lispro Injectable (ADMELOG) 3 Unit(s) SubCutaneous three times a day before meals  meropenem  IVPB 1000 milliGRAM(s) IV Intermittent every 8 hours  saccharomyces boulardii 250 milliGRAM(s) Oral two times a day  sodium chloride 0.9%. 1000 milliLiter(s) (100 mL/Hr) IV Continuous <Continuous>    MEDICATIONS  (PRN):  acetaminophen   Tablet .. 325 milliGRAM(s) Oral every 4 hours PRN Temp greater or equal to 38C (100.4F), Mild Pain (1 - 3)  HYDROmorphone  Injectable 0.5 milliGRAM(s) IV Push every 6 hours PRN Severe Pain (7 - 10)  morphine  - Injectable 2 milliGRAM(s) IV Push every 4 hours PRN Moderate Pain (4 - 6)      Allergies    No Known Allergies    Intolerances        Review of systems: able to tolerate soft diet    Vital Signs Last 24 Hrs  T(C): 37.2 (18 Jun 2021 11:05), Max: 38.1 (17 Jun 2021 16:08)  T(F): 98.9 (18 Jun 2021 11:05), Max: 100.5 (17 Jun 2021 16:08)  HR: 79 (18 Jun 2021 11:05) (79 - 92)  BP: 145/88 (18 Jun 2021 11:05) (137/83 - 151/78)  BP(mean): --  RR: 18 (18 Jun 2021 11:05) (18 - 20)  SpO2: 92% (18 Jun 2021 11:05) (92% - 97%)      LABS:                        13.1   9.10  )-----------( 199      ( 18 Jun 2021 07:39 )             39.3     06-18    133<L>  |  97<L>  |  10.4  ----------------------------<  167<H>  3.8   |  27.0  |  0.81    Ca    8.2<L>      18 Jun 2021 07:39            POCT Blood Glucose.: 161 mg/dL (06-18-21 @ 11:27)  POCT Blood Glucose.: 157 mg/dL (06-18-21 @ 08:10)  POCT Blood Glucose.: 181 mg/dL (06-17-21 @ 21:17)  POCT Blood Glucose.: 185 mg/dL (06-17-21 @ 17:12)  POCT Blood Glucose.: 233 mg/dL (06-17-21 @ 10:44)  POCT Blood Glucose.: 225 mg/dL (06-17-21 @ 07:45)  POCT Blood Glucose.: 325 mg/dL (06-16-21 @ 17:21)      RADIOLOGY & ADDITIONAL TESTS:

## 2021-06-18 NOTE — PROGRESS NOTE ADULT - ASSESSMENT
This 51 year old male with DM (has refused medications, attempting to diet control) presents with increasing pain over left jaw. Reports that initially started as a lump at angle of jaw last Tuesday. Swelling continued and was associated with increasing pain in the region for which he presented to the ER and was prescribed Augmentin and referred to ENT. Was advised by ED to stat warm compresses nd abx changed to clindamycin. States that had very mild improvement in swelling and pain transiently for a day, however last Monday has started increasing in size again Now extremely painful.   Subjectively reports feeling warm with possible rigors.   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   No abdominal pain, changes in gastrointestinal or genitourinary habits, no testicular pain or swelling.   (16 Jun 2021 13:08)    patient denies facial trauma.  was seen in ER prior to this, given antibiotics and sucking candy w/o improvement.   patient started on IV Zosyn by medical team.       Impression:  parotiditis  WBC elevation  diabetes      Plan:  had been on zosyn and WBC coming down.   however, - Still with swelling of face    Worsening sialoadenitis which can be infectious or inflammatory in etiology.  Stable left cervical adenopathy, presumably reactive.    - continue MERREM 1 gram q8H,   continue Clindamycin 900mg IV Q8H x 9 doses to decrease inflammation.   - IF CONTINUES TO IMPROVE OVER THE WEEKEND.  can consider discharge to home on 6/21/21 if stable: Augmentin 875mg PO BID x 7 more days.     - lemon candy ATC  - follow up all outstanding cultures  - trend temperature and WBC curve  - repeat cultures from blood and all sources if febrile.    WBC elevation is reactive  - much improved  - will follow and trend    Regarding Diabetes  - needs adequate control for wound healing  - suboptimal control: A1C with Estimated Average Glucose Result: 7.8 % (06-17-21 @ 07:33)  - suggest input from ENDOCRINE      Please call us P.R.N over the weekend should any new developments occur.

## 2021-06-18 NOTE — PROGRESS NOTE ADULT - SUBJECTIVE AND OBJECTIVE BOX
[facility] Division of Hospital Medicine    Chief Complaint:  Left face swelling     SUBJECTIVE / OVERNIGHT EVENTS:  No overnight events  This morning pt says his swelling and pain is improving      Patient denies chest pain, SOB, abd pain, N/V, fever, chills, dysuria or any other complaints. All remainder ROS negative.     MEDICATIONS  (STANDING):  clindamycin IVPB 900 milliGRAM(s) IV Intermittent every 8 hours  dextrose 40% Gel 15 Gram(s) Oral once  dextrose 5%. 1000 milliLiter(s) (50 mL/Hr) IV Continuous <Continuous>  dextrose 5%. 1000 milliLiter(s) (100 mL/Hr) IV Continuous <Continuous>  dextrose 50% Injectable 25 Gram(s) IV Push once  dextrose 50% Injectable 12.5 Gram(s) IV Push once  dextrose 50% Injectable 25 Gram(s) IV Push once  glucagon  Injectable 1 milliGRAM(s) IntraMuscular once  heparin   Injectable 5000 Unit(s) SubCutaneous every 12 hours  insulin glargine Injectable (LANTUS) 20 Unit(s) SubCutaneous at bedtime  insulin lispro (ADMELOG) corrective regimen sliding scale   SubCutaneous three times a day before meals  insulin lispro Injectable (ADMELOG) 3 Unit(s) SubCutaneous three times a day before meals  meropenem  IVPB 1000 milliGRAM(s) IV Intermittent every 8 hours  saccharomyces boulardii 250 milliGRAM(s) Oral two times a day  sodium chloride 0.9%. 1000 milliLiter(s) (100 mL/Hr) IV Continuous <Continuous>    MEDICATIONS  (PRN):  acetaminophen   Tablet .. 325 milliGRAM(s) Oral every 4 hours PRN Temp greater or equal to 38C (100.4F), Mild Pain (1 - 3)  HYDROmorphone  Injectable 0.5 milliGRAM(s) IV Push every 6 hours PRN Severe Pain (7 - 10)  morphine  - Injectable 2 milliGRAM(s) IV Push every 4 hours PRN Moderate Pain (4 - 6)        I&O's Summary    17 Jun 2021 07:01  -  18 Jun 2021 07:00  --------------------------------------------------------  IN: 700 mL / OUT: 1200 mL / NET: -500 mL        PHYSICAL EXAM:  Vital Signs Last 24 Hrs  T(C): 37.1 (18 Jun 2021 17:06), Max: 37.8 (17 Jun 2021 18:30)  T(F): 98.7 (18 Jun 2021 17:06), Max: 100.1 (17 Jun 2021 18:30)  HR: 79 (18 Jun 2021 17:06) (79 - 80)  BP: 125/83 (18 Jun 2021 17:06) (125/83 - 147/90)  BP(mean): --  RR: 19 (18 Jun 2021 17:06) (18 - 19)  SpO2: 95% (18 Jun 2021 17:06) (92% - 97%)      CONSTITUTIONAL: NAD, well-developed, well-groomed  ENMT: Swelling over left mandible, Warm and tender to palpation (improved today), no drainage noted   RESPIRATORY: Normal respiratory effort; lungs are clear to auscultation bilaterally  CARDIOVASCULAR: Regular rate and rhythm, normal S1 and S2, No lower extremity edema  ABDOMEN: Nontender to palpation, normoactive bowel sounds  MUSCULOSKELETAL:  No clubbing or cyanosis of digits  PSYCH: A+O to person, place, and time; affect appropriate  NEUROLOGY: No gross sensory or motor deficits   SKIN: No rashes; no palpable lesions      LABS:                        13.1   9.10  )-----------( 199      ( 18 Jun 2021 07:39 )             39.3     06-18    133<L>  |  97<L>  |  10.4  ----------------------------<  167<H>  3.8   |  27.0  |  0.81    Ca    8.2<L>      18 Jun 2021 07:39                Culture - Blood (collected 16 Jun 2021 07:23)  Source: .Blood Blood  Preliminary Report (18 Jun 2021 09:00):    No growth at 48 hours    Culture - Blood (collected 16 Jun 2021 06:45)  Source: .Blood Blood  Preliminary Report (18 Jun 2021 07:00):    No growth at 48 hours      CAPILLARY BLOOD GLUCOSE      POCT Blood Glucose.: 171 mg/dL (18 Jun 2021 16:56)  POCT Blood Glucose.: 161 mg/dL (18 Jun 2021 11:27)  POCT Blood Glucose.: 157 mg/dL (18 Jun 2021 08:10)  POCT Blood Glucose.: 181 mg/dL (17 Jun 2021 21:17)

## 2021-06-18 NOTE — PROGRESS NOTE ADULT - SUBJECTIVE AND OBJECTIVE BOX
Patient was examined, persistent cellulitis without significant improvement based on marking on skin. Mild fluctuance over parotid on exam.   Concern for abscess formation at this point    -Repeat CT neck with IV contrast in am  - NPO after midnight for possible OR for I&D tomorrrow

## 2021-06-18 NOTE — PROGRESS NOTE ADULT - ASSESSMENT
51 M with uncontrolled DM presenting with worsening parotitis.     Severe parotitis   - CT Maxillofacial: Severe edematous enlargement of the left parotid gland and left parotid tail, compatible with a severe acute parotitis, mildly worsened since prior exam. There is no focal drainable fluid collection or abscess present. No parotid calculi are present. There are subcutaneous edematous changes within the subcutaneous tissues. There are numerous enlarged enhancing lymph nodes throughout the bilateral neck region, left greater than right, compatible with marked reactive lymphadenopathy.  - CT Neck: Worsening sialoadenitis which can be infectious or inflammatory in etiology. Stable left cervical adenopathy, presumably reactive.  - Blood cultures x 2 with NGTD   - Continue IV Merrem and Clindamycin  - On Dilaudid for pain   - Appreciate ENT and ID consults     DM uncontrolled   - A1c 7.8 Pt stats has refused medications in past, has tried diet control  - Adjusted Lantus and Lispro doses   - Endo consult appreciated    DVT ppx: Heparin s/c Q 12, pt ambulatory

## 2021-06-19 LAB
ANION GAP SERPL CALC-SCNC: 6 MMOL/L — SIGNIFICANT CHANGE UP (ref 5–17)
ANION GAP SERPL CALC-SCNC: 9 MMOL/L — SIGNIFICANT CHANGE UP (ref 5–17)
BASOPHILS # BLD AUTO: 0.03 K/UL — SIGNIFICANT CHANGE UP (ref 0–0.2)
BASOPHILS NFR BLD AUTO: 0.4 % — SIGNIFICANT CHANGE UP (ref 0–2)
BLD GP AB SCN SERPL QL: SIGNIFICANT CHANGE UP
BUN SERPL-MCNC: 10.8 MG/DL — SIGNIFICANT CHANGE UP (ref 8–20)
BUN SERPL-MCNC: 6.7 MG/DL — LOW (ref 8–20)
CALCIUM SERPL-MCNC: 4.1 MG/DL — CRITICAL LOW (ref 8.6–10.2)
CALCIUM SERPL-MCNC: 8.9 MG/DL — SIGNIFICANT CHANGE UP (ref 8.6–10.2)
CHLORIDE SERPL-SCNC: 100 MMOL/L — SIGNIFICANT CHANGE UP (ref 98–107)
CHLORIDE SERPL-SCNC: 120 MMOL/L — HIGH (ref 98–107)
CO2 SERPL-SCNC: 16 MMOL/L — LOW (ref 22–29)
CO2 SERPL-SCNC: 28 MMOL/L — SIGNIFICANT CHANGE UP (ref 22–29)
CREAT SERPL-MCNC: 0.31 MG/DL — LOW (ref 0.5–1.3)
CREAT SERPL-MCNC: 0.78 MG/DL — SIGNIFICANT CHANGE UP (ref 0.5–1.3)
EOSINOPHIL # BLD AUTO: 0.25 K/UL — SIGNIFICANT CHANGE UP (ref 0–0.5)
EOSINOPHIL NFR BLD AUTO: 3.3 % — SIGNIFICANT CHANGE UP (ref 0–6)
GLUCOSE BLDC GLUCOMTR-MCNC: 124 MG/DL — HIGH (ref 70–99)
GLUCOSE BLDC GLUCOMTR-MCNC: 136 MG/DL — HIGH (ref 70–99)
GLUCOSE BLDC GLUCOMTR-MCNC: 148 MG/DL — HIGH (ref 70–99)
GLUCOSE BLDC GLUCOMTR-MCNC: 165 MG/DL — HIGH (ref 70–99)
GLUCOSE BLDC GLUCOMTR-MCNC: 251 MG/DL — HIGH (ref 70–99)
GLUCOSE SERPL-MCNC: 102 MG/DL — HIGH (ref 70–99)
GLUCOSE SERPL-MCNC: 172 MG/DL — HIGH (ref 70–99)
HCT VFR BLD CALC: 35.3 % — LOW (ref 39–50)
HGB BLD-MCNC: 11.5 G/DL — LOW (ref 13–17)
IMM GRANULOCYTES NFR BLD AUTO: 3.2 % — HIGH (ref 0–1.5)
LYMPHOCYTES # BLD AUTO: 1.11 K/UL — SIGNIFICANT CHANGE UP (ref 1–3.3)
LYMPHOCYTES # BLD AUTO: 14.8 % — SIGNIFICANT CHANGE UP (ref 13–44)
MCHC RBC-ENTMCNC: 29.8 PG — SIGNIFICANT CHANGE UP (ref 27–34)
MCHC RBC-ENTMCNC: 32.6 GM/DL — SIGNIFICANT CHANGE UP (ref 32–36)
MCV RBC AUTO: 91.5 FL — SIGNIFICANT CHANGE UP (ref 80–100)
MONOCYTES # BLD AUTO: 0.66 K/UL — SIGNIFICANT CHANGE UP (ref 0–0.9)
MONOCYTES NFR BLD AUTO: 8.8 % — SIGNIFICANT CHANGE UP (ref 2–14)
NEUTROPHILS # BLD AUTO: 5.2 K/UL — SIGNIFICANT CHANGE UP (ref 1.8–7.4)
NEUTROPHILS NFR BLD AUTO: 69.5 % — SIGNIFICANT CHANGE UP (ref 43–77)
PLATELET # BLD AUTO: 196 K/UL — SIGNIFICANT CHANGE UP (ref 150–400)
POTASSIUM SERPL-MCNC: 2.2 MMOL/L — CRITICAL LOW (ref 3.5–5.3)
POTASSIUM SERPL-MCNC: 4.4 MMOL/L — SIGNIFICANT CHANGE UP (ref 3.5–5.3)
POTASSIUM SERPL-SCNC: 2.2 MMOL/L — CRITICAL LOW (ref 3.5–5.3)
POTASSIUM SERPL-SCNC: 4.4 MMOL/L — SIGNIFICANT CHANGE UP (ref 3.5–5.3)
RBC # BLD: 3.86 M/UL — LOW (ref 4.2–5.8)
RBC # FLD: 12.4 % — SIGNIFICANT CHANGE UP (ref 10.3–14.5)
SODIUM SERPL-SCNC: 136 MMOL/L — SIGNIFICANT CHANGE UP (ref 135–145)
SODIUM SERPL-SCNC: 142 MMOL/L — SIGNIFICANT CHANGE UP (ref 135–145)
WBC # BLD: 7.49 K/UL — SIGNIFICANT CHANGE UP (ref 3.8–10.5)
WBC # FLD AUTO: 7.49 K/UL — SIGNIFICANT CHANGE UP (ref 3.8–10.5)

## 2021-06-19 PROCEDURE — 70491 CT SOFT TISSUE NECK W/DYE: CPT | Mod: 26

## 2021-06-19 PROCEDURE — 99233 SBSQ HOSP IP/OBS HIGH 50: CPT

## 2021-06-19 RX ORDER — POTASSIUM CHLORIDE 20 MEQ
10 PACKET (EA) ORAL
Refills: 0 | Status: DISCONTINUED | OUTPATIENT
Start: 2021-06-19 | End: 2021-06-19

## 2021-06-19 RX ORDER — SODIUM CHLORIDE 9 MG/ML
1000 INJECTION INTRAMUSCULAR; INTRAVENOUS; SUBCUTANEOUS
Refills: 0 | Status: DISCONTINUED | OUTPATIENT
Start: 2021-06-19 | End: 2021-06-20

## 2021-06-19 RX ORDER — SODIUM CHLORIDE 9 MG/ML
1000 INJECTION INTRAMUSCULAR; INTRAVENOUS; SUBCUTANEOUS
Refills: 0 | Status: DISCONTINUED | OUTPATIENT
Start: 2021-06-19 | End: 2021-06-19

## 2021-06-19 RX ORDER — ONDANSETRON 8 MG/1
4 TABLET, FILM COATED ORAL ONCE
Refills: 0 | Status: DISCONTINUED | OUTPATIENT
Start: 2021-06-19 | End: 2021-06-19

## 2021-06-19 RX ORDER — FENTANYL CITRATE 50 UG/ML
25 INJECTION INTRAVENOUS
Refills: 0 | Status: DISCONTINUED | OUTPATIENT
Start: 2021-06-19 | End: 2021-06-19

## 2021-06-19 RX ORDER — POTASSIUM CHLORIDE 20 MEQ
60 PACKET (EA) ORAL ONCE
Refills: 0 | Status: COMPLETED | OUTPATIENT
Start: 2021-06-19 | End: 2021-06-19

## 2021-06-19 RX ORDER — DEXAMETHASONE 0.5 MG/5ML
8 ELIXIR ORAL ONCE
Refills: 0 | Status: DISCONTINUED | OUTPATIENT
Start: 2021-06-19 | End: 2021-06-19

## 2021-06-19 RX ADMIN — MORPHINE SULFATE 2 MILLIGRAM(S): 50 CAPSULE, EXTENDED RELEASE ORAL at 00:40

## 2021-06-19 RX ADMIN — FENTANYL CITRATE 25 MICROGRAM(S): 50 INJECTION INTRAVENOUS at 20:50

## 2021-06-19 RX ADMIN — HYDROMORPHONE HYDROCHLORIDE 0.5 MILLIGRAM(S): 2 INJECTION INTRAMUSCULAR; INTRAVENOUS; SUBCUTANEOUS at 22:00

## 2021-06-19 RX ADMIN — MEROPENEM 100 MILLIGRAM(S): 1 INJECTION INTRAVENOUS at 21:44

## 2021-06-19 RX ADMIN — SODIUM CHLORIDE 150 MILLILITER(S): 9 INJECTION INTRAMUSCULAR; INTRAVENOUS; SUBCUTANEOUS at 21:44

## 2021-06-19 RX ADMIN — FENTANYL CITRATE 25 MICROGRAM(S): 50 INJECTION INTRAVENOUS at 20:18

## 2021-06-19 RX ADMIN — Medication 1: at 08:04

## 2021-06-19 RX ADMIN — MORPHINE SULFATE 2 MILLIGRAM(S): 50 CAPSULE, EXTENDED RELEASE ORAL at 09:53

## 2021-06-19 RX ADMIN — MEROPENEM 100 MILLIGRAM(S): 1 INJECTION INTRAVENOUS at 06:25

## 2021-06-19 RX ADMIN — INSULIN GLARGINE 20 UNIT(S): 100 INJECTION, SOLUTION SUBCUTANEOUS at 23:01

## 2021-06-19 RX ADMIN — Medication 100 MILLIGRAM(S): at 14:44

## 2021-06-19 RX ADMIN — HEPARIN SODIUM 5000 UNIT(S): 5000 INJECTION INTRAVENOUS; SUBCUTANEOUS at 06:25

## 2021-06-19 RX ADMIN — Medication 100 MILLIGRAM(S): at 21:44

## 2021-06-19 RX ADMIN — Medication 250 MILLIGRAM(S): at 06:25

## 2021-06-19 RX ADMIN — FENTANYL CITRATE 25 MICROGRAM(S): 50 INJECTION INTRAVENOUS at 20:27

## 2021-06-19 RX ADMIN — SODIUM CHLORIDE 150 MILLILITER(S): 9 INJECTION INTRAMUSCULAR; INTRAVENOUS; SUBCUTANEOUS at 16:21

## 2021-06-19 RX ADMIN — Medication 60 MILLIEQUIVALENT(S): at 11:38

## 2021-06-19 RX ADMIN — MORPHINE SULFATE 2 MILLIGRAM(S): 50 CAPSULE, EXTENDED RELEASE ORAL at 10:08

## 2021-06-19 RX ADMIN — MORPHINE SULFATE 2 MILLIGRAM(S): 50 CAPSULE, EXTENDED RELEASE ORAL at 00:55

## 2021-06-19 RX ADMIN — Medication 100 MILLIEQUIVALENT(S): at 11:38

## 2021-06-19 RX ADMIN — Medication 100 MILLIGRAM(S): at 06:25

## 2021-06-19 RX ADMIN — HYDROMORPHONE HYDROCHLORIDE 0.5 MILLIGRAM(S): 2 INJECTION INTRAMUSCULAR; INTRAVENOUS; SUBCUTANEOUS at 02:59

## 2021-06-19 RX ADMIN — HYDROMORPHONE HYDROCHLORIDE 0.5 MILLIGRAM(S): 2 INJECTION INTRAMUSCULAR; INTRAVENOUS; SUBCUTANEOUS at 21:47

## 2021-06-19 RX ADMIN — HYDROMORPHONE HYDROCHLORIDE 0.5 MILLIGRAM(S): 2 INJECTION INTRAMUSCULAR; INTRAVENOUS; SUBCUTANEOUS at 03:14

## 2021-06-19 RX ADMIN — FENTANYL CITRATE 25 MICROGRAM(S): 50 INJECTION INTRAVENOUS at 19:48

## 2021-06-19 RX ADMIN — MEROPENEM 100 MILLIGRAM(S): 1 INJECTION INTRAVENOUS at 14:45

## 2021-06-19 NOTE — PROGRESS NOTE ADULT - ASSESSMENT
51 M with uncontrolled DM presenting with worsening parotitis.     #Severe parotitis   - pt with reported subjective improvement and repeat CT with evolving phlegmona >> ENT planning I&D in OR  - Continue IV Merrem and Clindamycin  - On Dilaudid for pain   - Appreciate ENT and ID consults     DM uncontrolled   - A1c 7.8 Pt stats has refused medications in past, has tried diet control  - c/w Lantus 20 and Lispro 3 units doses   - Endo consult appreciated    DVT ppx: Heparin s/c Q 12, pt ambulatory   Dispo - pending clinical improvement  51 M with uncontrolled DM presenting with worsening parotitis.     #Severe parotitis   - pt with reported subjective improvement and repeat CT with evolving phlegmona >> ENT planning I&D in OR  - Continue IV Merrem and Clindamycin  - On Dilaudid for pain   - Appreciate ENT and ID consults     DM uncontrolled   - A1c 7.8 Pt stats has refused medications in past, has tried diet control  - c/w Lantus 20 and Lispro 3 units doses   - Endo consult appreciated    Hypokalemia adn Hypocalcemia  - seems to be related to lab errow>> ordered BMP repeat  - will replace K and Ca according to next bmp    DVT ppx: Heparin s/c Q 12, pt ambulatory   Dispo - pending clinical improvement

## 2021-06-19 NOTE — PROGRESS NOTE ADULT - SUBJECTIVE AND OBJECTIVE BOX
[facility] Division of Hospital Medicine    Chief Complaint:  parotitis     SUBJECTIVE: reports swelling went down, denies any problems with swalloing or chewing at this time    OVERNIGHT EVENTS: none reported     Patient denies chest pain, SOB, abd pain, N/V, fever, chills, dysuria or any other complaints. All remainder ROS negative.     MEDICATIONS  (STANDING):  clindamycin IVPB 900 milliGRAM(s) IV Intermittent every 8 hours  dextrose 40% Gel 15 Gram(s) Oral once  dextrose 5%. 1000 milliLiter(s) (50 mL/Hr) IV Continuous <Continuous>  dextrose 5%. 1000 milliLiter(s) (100 mL/Hr) IV Continuous <Continuous>  dextrose 50% Injectable 25 Gram(s) IV Push once  dextrose 50% Injectable 12.5 Gram(s) IV Push once  dextrose 50% Injectable 25 Gram(s) IV Push once  glucagon  Injectable 1 milliGRAM(s) IntraMuscular once  heparin   Injectable 5000 Unit(s) SubCutaneous every 12 hours  insulin glargine Injectable (LANTUS) 20 Unit(s) SubCutaneous at bedtime  insulin lispro (ADMELOG) corrective regimen sliding scale   SubCutaneous three times a day before meals  insulin lispro Injectable (ADMELOG) 3 Unit(s) SubCutaneous three times a day before meals  meropenem  IVPB 1000 milliGRAM(s) IV Intermittent every 8 hours  saccharomyces boulardii 250 milliGRAM(s) Oral two times a day  sodium chloride 0.9%. 1000 milliLiter(s) (150 mL/Hr) IV Continuous <Continuous>    MEDICATIONS  (PRN):  acetaminophen   Tablet .. 325 milliGRAM(s) Oral every 4 hours PRN Temp greater or equal to 38C (100.4F), Mild Pain (1 - 3)  HYDROmorphone  Injectable 0.5 milliGRAM(s) IV Push every 6 hours PRN Severe Pain (7 - 10)  morphine  - Injectable 2 milliGRAM(s) IV Push every 4 hours PRN Moderate Pain (4 - 6)        I&O's Summary    18 Jun 2021 07:01  -  19 Jun 2021 07:00  --------------------------------------------------------  IN: 0 mL / OUT: 1300 mL / NET: -1300 mL        PHYSICAL EXAM:  Vital Signs Last 24 Hrs  T(C): 36.9 (19 Jun 2021 11:18), Max: 37.2 (19 Jun 2021 04:20)  T(F): 98.5 (19 Jun 2021 11:18), Max: 98.9 (19 Jun 2021 04:20)  HR: 76 (19 Jun 2021 11:18) (72 - 76)  BP: 153/91 (19 Jun 2021 11:18) (142/88 - 153/91)  BP(mean): --  RR: 20 (19 Jun 2021 11:18) (18 - 20)  SpO2: 93% (19 Jun 2021 11:18) (90% - 93%)        CONSTITUTIONAL: NAD, well-developed, well-groomed  ENMT: Moist oral mucosa, good dentition, L paratoroid and surrounding sking with edema/induration and inflammation no palpable fluctuation on my exam no pharyngeal injection or exudates; normal dentition; No JVD  RESPIRATORY: Normal respiratory effort; lungs are clear to auscultation bilaterally  CARDIOVASCULAR: Regular rate and rhythm, normal S1 and S2, no murmur/rub/gallop; No lower extremity edema; Peripheral pulses are 2+ bilaterally  ABDOMEN: Nontender to palpation, normoactive bowel sounds, no rebound/guarding; No hepatosplenomegaly  MUSCLOSKELETAL:  no clubbing or cyanosis of digits; no joint swelling or tenderness to palpation  PSYCH: A+O to person, place, and time; affect appropriate  NEUROLOGY: CN 2-12 are intact and symmetric; no gross sensory deficits; was observed moving all 4 ext against gravity cooperating with exam.   SKIN: No rashes; no palpable lesions    LABS:                        11.5   7.49  )-----------( 196      ( 19 Jun 2021 08:58 )             35.3     06-19    136  |  100  |  10.8  ----------------------------<  172<H>  4.4   |  28.0  |  0.78    Ca    8.9      19 Jun 2021 11:00                CAPILLARY BLOOD GLUCOSE      POCT Blood Glucose.: 136 mg/dL (19 Jun 2021 16:20)  POCT Blood Glucose.: 148 mg/dL (19 Jun 2021 11:28)  POCT Blood Glucose.: 165 mg/dL (19 Jun 2021 08:04)  POCT Blood Glucose.: 223 mg/dL (18 Jun 2021 21:38)        RADIOLOGY & ADDITIONAL TESTS:  Results Reviewed:   Imaging Personally Reviewed:  Electrocardiogram Personally Reviewed:

## 2021-06-19 NOTE — PROGRESS NOTE ADULT - SUBJECTIVE AND OBJECTIVE BOX
CT reviewed, parotitis is progressing with phlegmonous changes/abscess formation.  Plan for OR for I&D today

## 2021-06-20 LAB
ANION GAP SERPL CALC-SCNC: 8 MMOL/L — SIGNIFICANT CHANGE UP (ref 5–17)
BUN SERPL-MCNC: 16.1 MG/DL — SIGNIFICANT CHANGE UP (ref 8–20)
CALCIUM SERPL-MCNC: 8.8 MG/DL — SIGNIFICANT CHANGE UP (ref 8.6–10.2)
CHLORIDE SERPL-SCNC: 99 MMOL/L — SIGNIFICANT CHANGE UP (ref 98–107)
CO2 SERPL-SCNC: 29 MMOL/L — SIGNIFICANT CHANGE UP (ref 22–29)
CREAT SERPL-MCNC: 0.99 MG/DL — SIGNIFICANT CHANGE UP (ref 0.5–1.3)
GLUCOSE BLDC GLUCOMTR-MCNC: 184 MG/DL — HIGH (ref 70–99)
GLUCOSE BLDC GLUCOMTR-MCNC: 189 MG/DL — HIGH (ref 70–99)
GLUCOSE BLDC GLUCOMTR-MCNC: 203 MG/DL — HIGH (ref 70–99)
GLUCOSE BLDC GLUCOMTR-MCNC: 213 MG/DL — HIGH (ref 70–99)
GLUCOSE SERPL-MCNC: 247 MG/DL — HIGH (ref 70–99)
HCT VFR BLD CALC: 41.2 % — SIGNIFICANT CHANGE UP (ref 39–50)
HGB BLD-MCNC: 13.6 G/DL — SIGNIFICANT CHANGE UP (ref 13–17)
MCHC RBC-ENTMCNC: 30.1 PG — SIGNIFICANT CHANGE UP (ref 27–34)
MCHC RBC-ENTMCNC: 33 GM/DL — SIGNIFICANT CHANGE UP (ref 32–36)
MCV RBC AUTO: 91.2 FL — SIGNIFICANT CHANGE UP (ref 80–100)
PLATELET # BLD AUTO: 260 K/UL — SIGNIFICANT CHANGE UP (ref 150–400)
POTASSIUM SERPL-MCNC: 5 MMOL/L — SIGNIFICANT CHANGE UP (ref 3.5–5.3)
POTASSIUM SERPL-SCNC: 5 MMOL/L — SIGNIFICANT CHANGE UP (ref 3.5–5.3)
RBC # BLD: 4.52 M/UL — SIGNIFICANT CHANGE UP (ref 4.2–5.8)
RBC # FLD: 12.3 % — SIGNIFICANT CHANGE UP (ref 10.3–14.5)
SODIUM SERPL-SCNC: 136 MMOL/L — SIGNIFICANT CHANGE UP (ref 135–145)
WBC # BLD: 10.72 K/UL — HIGH (ref 3.8–10.5)
WBC # FLD AUTO: 10.72 K/UL — HIGH (ref 3.8–10.5)

## 2021-06-20 PROCEDURE — 99233 SBSQ HOSP IP/OBS HIGH 50: CPT

## 2021-06-20 PROCEDURE — 99232 SBSQ HOSP IP/OBS MODERATE 35: CPT

## 2021-06-20 RX ORDER — INSULIN LISPRO 100/ML
4 VIAL (ML) SUBCUTANEOUS
Refills: 0 | Status: DISCONTINUED | OUTPATIENT
Start: 2021-06-20 | End: 2021-06-23

## 2021-06-20 RX ADMIN — MORPHINE SULFATE 2 MILLIGRAM(S): 50 CAPSULE, EXTENDED RELEASE ORAL at 21:59

## 2021-06-20 RX ADMIN — MEROPENEM 100 MILLIGRAM(S): 1 INJECTION INTRAVENOUS at 16:03

## 2021-06-20 RX ADMIN — INSULIN GLARGINE 20 UNIT(S): 100 INJECTION, SOLUTION SUBCUTANEOUS at 21:45

## 2021-06-20 RX ADMIN — Medication 100 MILLIGRAM(S): at 23:25

## 2021-06-20 RX ADMIN — MEROPENEM 100 MILLIGRAM(S): 1 INJECTION INTRAVENOUS at 21:44

## 2021-06-20 RX ADMIN — Medication 2: at 16:04

## 2021-06-20 RX ADMIN — Medication 1: at 09:29

## 2021-06-20 RX ADMIN — HEPARIN SODIUM 5000 UNIT(S): 5000 INJECTION INTRAVENOUS; SUBCUTANEOUS at 06:07

## 2021-06-20 RX ADMIN — MORPHINE SULFATE 2 MILLIGRAM(S): 50 CAPSULE, EXTENDED RELEASE ORAL at 16:05

## 2021-06-20 RX ADMIN — Medication 3 UNIT(S): at 12:04

## 2021-06-20 RX ADMIN — HEPARIN SODIUM 5000 UNIT(S): 5000 INJECTION INTRAVENOUS; SUBCUTANEOUS at 16:05

## 2021-06-20 RX ADMIN — SODIUM CHLORIDE 150 MILLILITER(S): 9 INJECTION INTRAMUSCULAR; INTRAVENOUS; SUBCUTANEOUS at 06:07

## 2021-06-20 RX ADMIN — Medication 3 UNIT(S): at 09:30

## 2021-06-20 RX ADMIN — SODIUM CHLORIDE 150 MILLILITER(S): 9 INJECTION INTRAMUSCULAR; INTRAVENOUS; SUBCUTANEOUS at 09:30

## 2021-06-20 RX ADMIN — MORPHINE SULFATE 2 MILLIGRAM(S): 50 CAPSULE, EXTENDED RELEASE ORAL at 21:44

## 2021-06-20 RX ADMIN — MEROPENEM 100 MILLIGRAM(S): 1 INJECTION INTRAVENOUS at 06:06

## 2021-06-20 RX ADMIN — Medication 1: at 12:04

## 2021-06-20 RX ADMIN — Medication 250 MILLIGRAM(S): at 06:07

## 2021-06-20 RX ADMIN — Medication 250 MILLIGRAM(S): at 16:05

## 2021-06-20 RX ADMIN — Medication 4 UNIT(S): at 16:04

## 2021-06-20 NOTE — PROGRESS NOTE ADULT - ASSESSMENT
51 M with uncontrolled DM presenting with worsening parotitis, was admitted and started on Meropenem and Clyndamycin. ID and ENT team were consulted. Repeat CT on 06/19 with worsening of phlegmona and developing abscess. He went to OR on 06/19 fro I&D by ENT.     #Severe parotitis, s/p I&D on 06/19  - Continue IV Merrem  - On Dilaudid for pain   - Appreciate ENT and ID consults    DM uncontrolled   - A1c 7.8 Pt stats has refused medications in past, has tried diet control  - c/w Lantus 20 and Lispro 3 units doses   - Endo consult appreciated    Hypokalemia adn Hypocalcemia  - seems to be related to lab errow>> ordered BMP repeat  - will replace K and Ca according to next bmp    DVT ppx: Heparin s/c Q 12, pt ambulatory   Dispo - pending clinical improvement

## 2021-06-20 NOTE — PROGRESS NOTE ADULT - ASSESSMENT
s/p I&D of parotid abscess/phlegmon  - still moderate amt of swelling and some drainage from Penrose  - cont antibiotics  - dressing changes as needed  - will follow

## 2021-06-20 NOTE — PROGRESS NOTE ADULT - SUBJECTIVE AND OBJECTIVE BOX
Patient reports some soreness at the incision site. Otherwise no events overnight      PE: incison c/d/i  Penrose in place, mild serosanguinous drainage.  Dressing changed

## 2021-06-20 NOTE — PROGRESS NOTE ADULT - SUBJECTIVE AND OBJECTIVE BOX
INTERVAL HPI/OVERNIGHT EVENTS: follow up of diabetes    MEDICATIONS  (STANDING):  dextrose 40% Gel 15 Gram(s) Oral once  dextrose 5%. 1000 milliLiter(s) (50 mL/Hr) IV Continuous <Continuous>  dextrose 5%. 1000 milliLiter(s) (100 mL/Hr) IV Continuous <Continuous>  dextrose 50% Injectable 25 Gram(s) IV Push once  dextrose 50% Injectable 12.5 Gram(s) IV Push once  dextrose 50% Injectable 25 Gram(s) IV Push once  glucagon  Injectable 1 milliGRAM(s) IntraMuscular once  heparin   Injectable 5000 Unit(s) SubCutaneous every 12 hours  insulin glargine Injectable (LANTUS) 20 Unit(s) SubCutaneous at bedtime  insulin lispro (ADMELOG) corrective regimen sliding scale   SubCutaneous three times a day before meals  insulin lispro Injectable (ADMELOG) 3 Unit(s) SubCutaneous three times a day before meals  meropenem  IVPB 1000 milliGRAM(s) IV Intermittent every 8 hours  saccharomyces boulardii 250 milliGRAM(s) Oral two times a day    MEDICATIONS  (PRN):  acetaminophen   Tablet .. 325 milliGRAM(s) Oral every 4 hours PRN Temp greater or equal to 38C (100.4F), Mild Pain (1 - 3)  HYDROmorphone  Injectable 0.5 milliGRAM(s) IV Push every 6 hours PRN Severe Pain (7 - 10)  morphine  - Injectable 2 milliGRAM(s) IV Push every 4 hours PRN Moderate Pain (4 - 6)      Allergies    No Known Allergies    Intolerances        Review of systems: eating well    Vital Signs Last 24 Hrs  T(C): 36.6 (20 Jun 2021 11:03), Max: 37 (19 Jun 2021 17:50)  T(F): 97.8 (20 Jun 2021 11:03), Max: 98.6 (19 Jun 2021 17:50)  HR: 67 (20 Jun 2021 11:03) (49 - 77)  BP: 122/69 (20 Jun 2021 11:03) (122/69 - 164/88)  BP(mean): --  RR: 18 (20 Jun 2021 11:03) (11 - 20)  SpO2: 91% (20 Jun 2021 11:03) (91% - 100%)      LABS:                        13.6   10.72 )-----------( 260      ( 20 Jun 2021 07:04 )             41.2     06-20    136  |  99  |  16.1  ----------------------------<  247<H>  5.0   |  29.0  |  0.99    Ca    8.8      20 Jun 2021 07:04            POCT Blood Glucose.: 184 mg/dL (06-20-21 @ 11:21)  POCT Blood Glucose.: 189 mg/dL (06-20-21 @ 09:29)  POCT Blood Glucose.: 251 mg/dL (06-19-21 @ 23:00)  POCT Blood Glucose.: 124 mg/dL (06-19-21 @ 18:11)  POCT Blood Glucose.: 136 mg/dL (06-19-21 @ 16:20)  POCT Blood Glucose.: 148 mg/dL (06-19-21 @ 11:28)  POCT Blood Glucose.: 165 mg/dL (06-19-21 @ 08:04)  POCT Blood Glucose.: 223 mg/dL (06-18-21 @ 21:38)  POCT Blood Glucose.: 171 mg/dL (06-18-21 @ 16:56)  POCT Blood Glucose.: 161 mg/dL (06-18-21 @ 11:27)  POCT Blood Glucose.: 157 mg/dL (06-18-21 @ 08:10)  POCT Blood Glucose.: 181 mg/dL (06-17-21 @ 21:17)  POCT Blood Glucose.: 185 mg/dL (06-17-21 @ 17:12)      RADIOLOGY & ADDITIONAL TESTS:

## 2021-06-20 NOTE — PROGRESS NOTE ADULT - SUBJECTIVE AND OBJECTIVE BOX
[facility] Division of Hospital Medicine    Chief Complaint:  parotitis     SUBJECTIVE: reports feeling better, mild -mod pain in area of surgery    OVERNIGHT EVENTS: went to OR for I&d with drainage left in place    Patient denies chest pain, SOB, abd pain, N/V, fever, chills, dysuria or any other complaints. All remainder ROS negative.     MEDICATIONS  (STANDING):  dextrose 40% Gel 15 Gram(s) Oral once  dextrose 5%. 1000 milliLiter(s) (50 mL/Hr) IV Continuous <Continuous>  dextrose 5%. 1000 milliLiter(s) (100 mL/Hr) IV Continuous <Continuous>  dextrose 50% Injectable 25 Gram(s) IV Push once  dextrose 50% Injectable 12.5 Gram(s) IV Push once  dextrose 50% Injectable 25 Gram(s) IV Push once  glucagon  Injectable 1 milliGRAM(s) IntraMuscular once  heparin   Injectable 5000 Unit(s) SubCutaneous every 12 hours  insulin glargine Injectable (LANTUS) 20 Unit(s) SubCutaneous at bedtime  insulin lispro (ADMELOG) corrective regimen sliding scale   SubCutaneous three times a day before meals  insulin lispro Injectable (ADMELOG) 3 Unit(s) SubCutaneous three times a day before meals  meropenem  IVPB 1000 milliGRAM(s) IV Intermittent every 8 hours  saccharomyces boulardii 250 milliGRAM(s) Oral two times a day  sodium chloride 0.9%. 1000 milliLiter(s) (150 mL/Hr) IV Continuous <Continuous>    MEDICATIONS  (PRN):  acetaminophen   Tablet .. 325 milliGRAM(s) Oral every 4 hours PRN Temp greater or equal to 38C (100.4F), Mild Pain (1 - 3)  HYDROmorphone  Injectable 0.5 milliGRAM(s) IV Push every 6 hours PRN Severe Pain (7 - 10)  morphine  - Injectable 2 milliGRAM(s) IV Push every 4 hours PRN Moderate Pain (4 - 6)          I&O's Summary    18 Jun 2021 07:01  -  19 Jun 2021 07:00  --------------------------------------------------------  IN: 0 mL / OUT: 1300 mL / NET: -1300 mL        PHYSICAL EXAM:  Vital Signs Last 24 Hrs  T(C): 36.6 (20 Jun 2021 11:03), Max: 37 (19 Jun 2021 17:50)  T(F): 97.8 (20 Jun 2021 11:03), Max: 98.6 (19 Jun 2021 17:50)  HR: 67 (20 Jun 2021 11:03) (49 - 77)  BP: 122/69 (20 Jun 2021 11:03) (122/69 - 164/88)  BP(mean): --  RR: 18 (20 Jun 2021 11:03) (11 - 20)  SpO2: 91% (20 Jun 2021 11:03) (91% - 100%)        CONSTITUTIONAL: NAD, well-developed, well-groomed  ENMT: Moist oral mucosa, good dentition, L paratoroid and surrounding skin with edema/induration, with drain in place after I&D, no discharge or blood from drain on my exam  RESPIRATORY: Normal respiratory effort; lungs are clear to auscultation bilaterally  CARDIOVASCULAR: Regular rate and rhythm, normal S1 and S2, no murmur/rub/gallop; No lower extremity edema; Peripheral pulses are 2+ bilaterally  ABDOMEN: Nontender to palpation, normoactive bowel sounds, no rebound/guarding; No hepatosplenomegaly  MUSCLOSKELETAL:  no clubbing or cyanosis of digits; no joint swelling or tenderness to palpation  PSYCH: A+O to person, place, and time; affect appropriate  NEUROLOGY: CN 2-12 are intact and symmetric; no gross sensory deficits; was observed moving all 4 ext against gravity cooperating with exam.   SKIN: No rashes; no palpable lesions    LABS:                        13.6   10.72 )-----------( 260      ( 20 Jun 2021 07:04 )             41.2     06-20    136  |  99  |  16.1  ----------------------------<  247<H>  5.0   |  29.0  |  0.99    Ca    8.8      20 Jun 2021 07:04                CAPILLARY BLOOD GLUCOSE      POCT Blood Glucose.: 184 mg/dL (20 Jun 2021 11:21)  POCT Blood Glucose.: 189 mg/dL (20 Jun 2021 09:29)  POCT Blood Glucose.: 251 mg/dL (19 Jun 2021 23:00)  POCT Blood Glucose.: 124 mg/dL (19 Jun 2021 18:11)  POCT Blood Glucose.: 136 mg/dL (19 Jun 2021 16:20)        RADIOLOGY & ADDITIONAL TESTS:  Results Reviewed:   Imaging Personally Reviewed:  Electrocardiogram Personally Reviewed:

## 2021-06-21 LAB
ANION GAP SERPL CALC-SCNC: 9 MMOL/L — SIGNIFICANT CHANGE UP (ref 5–17)
BUN SERPL-MCNC: 17.9 MG/DL — SIGNIFICANT CHANGE UP (ref 8–20)
CALCIUM SERPL-MCNC: 8.9 MG/DL — SIGNIFICANT CHANGE UP (ref 8.6–10.2)
CHLORIDE SERPL-SCNC: 101 MMOL/L — SIGNIFICANT CHANGE UP (ref 98–107)
CO2 SERPL-SCNC: 27 MMOL/L — SIGNIFICANT CHANGE UP (ref 22–29)
CREAT SERPL-MCNC: 0.95 MG/DL — SIGNIFICANT CHANGE UP (ref 0.5–1.3)
CULTURE RESULTS: SIGNIFICANT CHANGE UP
CULTURE RESULTS: SIGNIFICANT CHANGE UP
GLUCOSE BLDC GLUCOMTR-MCNC: 113 MG/DL — HIGH (ref 70–99)
GLUCOSE BLDC GLUCOMTR-MCNC: 120 MG/DL — HIGH (ref 70–99)
GLUCOSE BLDC GLUCOMTR-MCNC: 127 MG/DL — HIGH (ref 70–99)
GLUCOSE BLDC GLUCOMTR-MCNC: 170 MG/DL — HIGH (ref 70–99)
GLUCOSE SERPL-MCNC: 153 MG/DL — HIGH (ref 70–99)
HCT VFR BLD CALC: 40.7 % — SIGNIFICANT CHANGE UP (ref 39–50)
HGB BLD-MCNC: 13.2 G/DL — SIGNIFICANT CHANGE UP (ref 13–17)
MCHC RBC-ENTMCNC: 29.7 PG — SIGNIFICANT CHANGE UP (ref 27–34)
MCHC RBC-ENTMCNC: 32.4 GM/DL — SIGNIFICANT CHANGE UP (ref 32–36)
MCV RBC AUTO: 91.5 FL — SIGNIFICANT CHANGE UP (ref 80–100)
PLATELET # BLD AUTO: 239 K/UL — SIGNIFICANT CHANGE UP (ref 150–400)
POTASSIUM SERPL-MCNC: 4.2 MMOL/L — SIGNIFICANT CHANGE UP (ref 3.5–5.3)
POTASSIUM SERPL-SCNC: 4.2 MMOL/L — SIGNIFICANT CHANGE UP (ref 3.5–5.3)
RBC # BLD: 4.45 M/UL — SIGNIFICANT CHANGE UP (ref 4.2–5.8)
RBC # FLD: 12.4 % — SIGNIFICANT CHANGE UP (ref 10.3–14.5)
SODIUM SERPL-SCNC: 137 MMOL/L — SIGNIFICANT CHANGE UP (ref 135–145)
SPECIMEN SOURCE: SIGNIFICANT CHANGE UP
SPECIMEN SOURCE: SIGNIFICANT CHANGE UP
WBC # BLD: 9.81 K/UL — SIGNIFICANT CHANGE UP (ref 3.8–10.5)
WBC # FLD AUTO: 9.81 K/UL — SIGNIFICANT CHANGE UP (ref 3.8–10.5)

## 2021-06-21 PROCEDURE — 99232 SBSQ HOSP IP/OBS MODERATE 35: CPT

## 2021-06-21 PROCEDURE — 99233 SBSQ HOSP IP/OBS HIGH 50: CPT

## 2021-06-21 RX ORDER — BENZOCAINE AND MENTHOL 5; 1 G/100ML; G/100ML
1 LIQUID ORAL EVERY 8 HOURS
Refills: 0 | Status: DISCONTINUED | OUTPATIENT
Start: 2021-06-21 | End: 2021-06-23

## 2021-06-21 RX ADMIN — INSULIN GLARGINE 20 UNIT(S): 100 INJECTION, SOLUTION SUBCUTANEOUS at 21:31

## 2021-06-21 RX ADMIN — MORPHINE SULFATE 2 MILLIGRAM(S): 50 CAPSULE, EXTENDED RELEASE ORAL at 02:42

## 2021-06-21 RX ADMIN — MORPHINE SULFATE 2 MILLIGRAM(S): 50 CAPSULE, EXTENDED RELEASE ORAL at 02:27

## 2021-06-21 RX ADMIN — MORPHINE SULFATE 2 MILLIGRAM(S): 50 CAPSULE, EXTENDED RELEASE ORAL at 15:56

## 2021-06-21 RX ADMIN — Medication 250 MILLIGRAM(S): at 06:31

## 2021-06-21 RX ADMIN — MORPHINE SULFATE 2 MILLIGRAM(S): 50 CAPSULE, EXTENDED RELEASE ORAL at 06:31

## 2021-06-21 RX ADMIN — MEROPENEM 100 MILLIGRAM(S): 1 INJECTION INTRAVENOUS at 06:30

## 2021-06-21 RX ADMIN — HEPARIN SODIUM 5000 UNIT(S): 5000 INJECTION INTRAVENOUS; SUBCUTANEOUS at 06:31

## 2021-06-21 RX ADMIN — Medication 4 UNIT(S): at 11:13

## 2021-06-21 RX ADMIN — BENZOCAINE AND MENTHOL 1 LOZENGE: 5; 1 LIQUID ORAL at 13:41

## 2021-06-21 RX ADMIN — MORPHINE SULFATE 2 MILLIGRAM(S): 50 CAPSULE, EXTENDED RELEASE ORAL at 06:46

## 2021-06-21 RX ADMIN — Medication 4 UNIT(S): at 16:18

## 2021-06-21 RX ADMIN — Medication 4 UNIT(S): at 07:43

## 2021-06-21 RX ADMIN — Medication 1 TABLET(S): at 17:05

## 2021-06-21 RX ADMIN — MORPHINE SULFATE 2 MILLIGRAM(S): 50 CAPSULE, EXTENDED RELEASE ORAL at 10:35

## 2021-06-21 RX ADMIN — MORPHINE SULFATE 2 MILLIGRAM(S): 50 CAPSULE, EXTENDED RELEASE ORAL at 11:11

## 2021-06-21 RX ADMIN — Medication 1: at 16:18

## 2021-06-21 RX ADMIN — MORPHINE SULFATE 2 MILLIGRAM(S): 50 CAPSULE, EXTENDED RELEASE ORAL at 20:20

## 2021-06-21 RX ADMIN — HEPARIN SODIUM 5000 UNIT(S): 5000 INJECTION INTRAVENOUS; SUBCUTANEOUS at 16:19

## 2021-06-21 RX ADMIN — MORPHINE SULFATE 2 MILLIGRAM(S): 50 CAPSULE, EXTENDED RELEASE ORAL at 20:06

## 2021-06-21 RX ADMIN — Medication 250 MILLIGRAM(S): at 16:19

## 2021-06-21 RX ADMIN — MORPHINE SULFATE 2 MILLIGRAM(S): 50 CAPSULE, EXTENDED RELEASE ORAL at 15:12

## 2021-06-21 NOTE — PROGRESS NOTE ADULT - SUBJECTIVE AND OBJECTIVE BOX
continued drainage grom the let parotid penrose drain.    continue presence of penrose    will likely DC drain tomorrow or next day    cont IV abx

## 2021-06-21 NOTE — PROGRESS NOTE ADULT - ASSESSMENT
51 M with uncontrolled DM presenting with worsening parotitis, was admitted and started on Meropenem and Clyndamycin. ID and ENT team were consulted. Repeat CT on 06/19 with worsening of phlegmona and developing abscess. He went to OR on 06/19 fro I&D by ENT. He slowly improving.     #Severe parotitis, s/p I&D on 06/19  - Continue Augmentin bid as per ID recomendation  - c/w current pain management  - Appreciate ENT and ID consults    DM uncontrolled   - A1c 7.8 Pt stats has refused medications in past, has tried diet control  - c/w Lantus 20 and Lispro 4 units doses   - Endo consult appreciated    Hypokalemia adn Hypocalcemia  - seems to be related to lab errow>> ordered BMP repeat  - will replace K and Ca according to next bmp    DVT ppx: Heparin s/c Q 12, pt ambulatory   Dispo - guided by ENT recommendations

## 2021-06-21 NOTE — PROGRESS NOTE ADULT - ASSESSMENT
This 51 year old male with DM (has refused medications, attempting to diet control) presents with increasing pain over left jaw. Reports that initially started as a lump at angle of jaw last Tuesday. Swelling continued and was associated with increasing pain in the region for which he presented to the ER and was prescribed Augmentin and referred to ENT. Was advised by ED to stat warm compresses nd abx changed to clindamycin. States that had very mild improvement in swelling and pain transiently for a day, however last Monday has started increasing in size again Now extremely painful.   Subjectively reports feeling warm with possible rigors.   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   No abdominal pain, changes in gastrointestinal or genitourinary habits, no testicular pain or swelling.   (16 Jun 2021 13:08)    patient denies facial trauma.  was seen in ER prior to this, given antibiotics and sucking candy w/o improvement.   patient started on IV Zosyn by medical team.       Impression:  parotiditis  WBC elevation  diabetes      Plan:  s/p incision and drainage  passive drain in place    de-escalate to Augmentin 875mg PO BID  empirically  - no surgical culture sent  - WILL FOLLOW UP ON CLINICAL PROGRESS    WBC elevation is reactive  WBC Count: 9.81 K/uL (06-21-21 @ 07:08)  WBC Count: 10.72 K/uL (06-20-21 @ 07:04)  WBC Count: 7.49 K/uL (06-19-21 @ 08:58)  WBC Count: 9.10 K/uL (06-18-21 @ 07:39)  WBC Count: 10.71 K/uL (06-17-21 @ 07:33)  - much improved  - will follow and trend    Regarding Diabetes  - needs adequate control for wound healing  - suboptimal control: A1C with Estimated Average Glucose Result: 7.8 % (06-17-21 @ 07:33)  - suggest input from ENDOCRINE

## 2021-06-21 NOTE — PROGRESS NOTE ADULT - SUBJECTIVE AND OBJECTIVE BOX
INTERVAL HPI/OVERNIGHT EVENTS: follow up of diabetes    MEDICATIONS  (STANDING):  dextrose 40% Gel 15 Gram(s) Oral once  dextrose 5%. 1000 milliLiter(s) (50 mL/Hr) IV Continuous <Continuous>  dextrose 5%. 1000 milliLiter(s) (100 mL/Hr) IV Continuous <Continuous>  dextrose 50% Injectable 25 Gram(s) IV Push once  dextrose 50% Injectable 12.5 Gram(s) IV Push once  dextrose 50% Injectable 25 Gram(s) IV Push once  glucagon  Injectable 1 milliGRAM(s) IntraMuscular once  heparin   Injectable 5000 Unit(s) SubCutaneous every 12 hours  insulin glargine Injectable (LANTUS) 20 Unit(s) SubCutaneous at bedtime  insulin lispro (ADMELOG) corrective regimen sliding scale   SubCutaneous three times a day before meals  insulin lispro Injectable (ADMELOG) 3 Unit(s) SubCutaneous three times a day before meals  meropenem  IVPB 1000 milliGRAM(s) IV Intermittent every 8 hours  saccharomyces boulardii 250 milliGRAM(s) Oral two times a day    MEDICATIONS  (PRN):  acetaminophen   Tablet .. 325 milliGRAM(s) Oral every 4 hours PRN Temp greater or equal to 38C (100.4F), Mild Pain (1 - 3)  HYDROmorphone  Injectable 0.5 milliGRAM(s) IV Push every 6 hours PRN Severe Pain (7 - 10)  morphine  - Injectable 2 milliGRAM(s) IV Push every 4 hours PRN Moderate Pain (4 - 6)      Allergies    No Known Allergies    Intolerances        Review of systems: eating well    Vital Signs Last 24 Hrs  T(C): 36.6 (20 Jun 2021 11:03), Max: 37 (19 Jun 2021 17:50)  T(F): 97.8 (20 Jun 2021 11:03), Max: 98.6 (19 Jun 2021 17:50)  HR: 67 (20 Jun 2021 11:03) (49 - 77)  BP: 122/69 (20 Jun 2021 11:03) (122/69 - 164/88)  BP(mean): --  RR: 18 (20 Jun 2021 11:03) (11 - 20)  SpO2: 91% (20 Jun 2021 11:03) (91% - 100%)    CONSTITUTIONAL: NAD, well-developed, well-groomed  RESPIRATORY: Normal respiratory effort, clear to auscultation bilaterally  CARDIOVASCULAR:RRR normal S1 and S2, no murmur/rub/gallop; No lower extremity edema  ABDOMEN: Nontender to palpation, normal bowel sounds  MUSCLOSKELETAL:  no clubbing or cyanosis of digits; no joint swelling or tenderness to palpation  PSYCH: A+O to person, place, and time; affect appropriate  SKIN: No rashes; no palpable lesions        LABS:                        13.6   10.72 )-----------( 260      ( 20 Jun 2021 07:04 )             41.2     06-20    136  |  99  |  16.1  ----------------------------<  247<H>  5.0   |  29.0  |  0.99    Ca    8.8      20 Jun 2021 07:04      POCT Blood Glucose.: 184 mg/dL (06-20-21 @ 11:21)  POCT Blood Glucose.: 189 mg/dL (06-20-21 @ 09:29)  POCT Blood Glucose.: 251 mg/dL (06-19-21 @ 23:00)  POCT Blood Glucose.: 124 mg/dL (06-19-21 @ 18:11)  POCT Blood Glucose.: 136 mg/dL (06-19-21 @ 16:20)  POCT Blood Glucose.: 148 mg/dL (06-19-21 @ 11:28)  POCT Blood Glucose.: 165 mg/dL (06-19-21 @ 08:04)  POCT Blood Glucose.: 223 mg/dL (06-18-21 @ 21:38)  POCT Blood Glucose.: 171 mg/dL (06-18-21 @ 16:56)  POCT Blood Glucose.: 161 mg/dL (06-18-21 @ 11:27)  POCT Blood Glucose.: 157 mg/dL (06-18-21 @ 08:10)  POCT Blood Glucose.: 181 mg/dL (06-17-21 @ 21:17)  POCT Blood Glucose.: 185 mg/dL (06-17-21 @ 17:12)     INTERVAL HPI/OVERNIGHT EVENTS: follow up of diabetes, no events over night    MEDICATIONS  (STANDING):  dextrose 40% Gel 15 Gram(s) Oral once  dextrose 5%. 1000 milliLiter(s) (50 mL/Hr) IV Continuous <Continuous>  dextrose 5%. 1000 milliLiter(s) (100 mL/Hr) IV Continuous <Continuous>  dextrose 50% Injectable 25 Gram(s) IV Push once  dextrose 50% Injectable 12.5 Gram(s) IV Push once  dextrose 50% Injectable 25 Gram(s) IV Push once  glucagon  Injectable 1 milliGRAM(s) IntraMuscular once  heparin   Injectable 5000 Unit(s) SubCutaneous every 12 hours  insulin glargine Injectable (LANTUS) 20 Unit(s) SubCutaneous at bedtime  insulin lispro (ADMELOG) corrective regimen sliding scale   SubCutaneous three times a day before meals  insulin lispro Injectable (ADMELOG) 3 Unit(s) SubCutaneous three times a day before meals  meropenem  IVPB 1000 milliGRAM(s) IV Intermittent every 8 hours  saccharomyces boulardii 250 milliGRAM(s) Oral two times a day    MEDICATIONS  (PRN):  acetaminophen   Tablet .. 325 milliGRAM(s) Oral every 4 hours PRN Temp greater or equal to 38C (100.4F), Mild Pain (1 - 3)  HYDROmorphone  Injectable 0.5 milliGRAM(s) IV Push every 6 hours PRN Severe Pain (7 - 10)  morphine  - Injectable 2 milliGRAM(s) IV Push every 4 hours PRN Moderate Pain (4 - 6)      Allergies    No Known Allergies    Intolerances        Review of systems: eating well, no complains, no n/v, no CP, no SOB, no pain    Vital Signs Last 24 Hrs  T(C): 36.6 (20 Jun 2021 11:03), Max: 37 (19 Jun 2021 17:50)  T(F): 97.8 (20 Jun 2021 11:03), Max: 98.6 (19 Jun 2021 17:50)  HR: 67 (20 Jun 2021 11:03) (49 - 77)  BP: 122/69 (20 Jun 2021 11:03) (122/69 - 164/88)  BP(mean): --  RR: 18 (20 Jun 2021 11:03) (11 - 20)  SpO2: 91% (20 Jun 2021 11:03) (91% - 100%)    CONSTITUTIONAL: NAD, well-developed, well-groomed  RESPIRATORY: Normal respiratory effort, clear to auscultation bilaterally  CARDIOVASCULAR:RRR normal S1 and S2, no murmur/rub/gallop; No lower extremity edema  ABDOMEN: Nontender to palpation, normal bowel sounds  MUSCLOSKELETAL:  no clubbing or cyanosis of digits; no joint swelling or tenderness to palpation  PSYCH: A+O to person, place, and time; affect appropriate  SKIN: No rashes; no palpable lesions        LABS:                        13.6   10.72 )-----------( 260      ( 20 Jun 2021 07:04 )             41.2     06-20    136  |  99  |  16.1  ----------------------------<  247<H>  5.0   |  29.0  |  0.99    Ca    8.8      20 Jun 2021 07:04      POCT Blood Glucose.: 184 mg/dL (06-20-21 @ 11:21)  POCT Blood Glucose.: 189 mg/dL (06-20-21 @ 09:29)  POCT Blood Glucose.: 251 mg/dL (06-19-21 @ 23:00)  POCT Blood Glucose.: 124 mg/dL (06-19-21 @ 18:11)  POCT Blood Glucose.: 136 mg/dL (06-19-21 @ 16:20)  POCT Blood Glucose.: 148 mg/dL (06-19-21 @ 11:28)  POCT Blood Glucose.: 165 mg/dL (06-19-21 @ 08:04)  POCT Blood Glucose.: 223 mg/dL (06-18-21 @ 21:38)  POCT Blood Glucose.: 171 mg/dL (06-18-21 @ 16:56)  POCT Blood Glucose.: 161 mg/dL (06-18-21 @ 11:27)  POCT Blood Glucose.: 157 mg/dL (06-18-21 @ 08:10)  POCT Blood Glucose.: 181 mg/dL (06-17-21 @ 21:17)  POCT Blood Glucose.: 185 mg/dL (06-17-21 @ 17:12)

## 2021-06-21 NOTE — PROGRESS NOTE ADULT - SUBJECTIVE AND OBJECTIVE BOX
[facility] Division of Hospital Medicine    Chief Complaint:  parotitis     SUBJECTIVE: reports some dry cough and soar throat    OVERNIGHT EVENTS: none reported    Patient denies chest pain, SOB, abd pain, N/V, fever, chills, dysuria or any other complaints. All remainder ROS negative.     MEDICATIONS  (STANDING):  amoxicillin  875 milliGRAM(s)/clavulanate 1 Tablet(s) Oral two times a day  dextrose 40% Gel 15 Gram(s) Oral once  dextrose 5%. 1000 milliLiter(s) (50 mL/Hr) IV Continuous <Continuous>  dextrose 5%. 1000 milliLiter(s) (100 mL/Hr) IV Continuous <Continuous>  dextrose 50% Injectable 25 Gram(s) IV Push once  dextrose 50% Injectable 12.5 Gram(s) IV Push once  dextrose 50% Injectable 25 Gram(s) IV Push once  glucagon  Injectable 1 milliGRAM(s) IntraMuscular once  heparin   Injectable 5000 Unit(s) SubCutaneous every 12 hours  insulin glargine Injectable (LANTUS) 20 Unit(s) SubCutaneous at bedtime  insulin lispro (ADMELOG) corrective regimen sliding scale   SubCutaneous three times a day before meals  insulin lispro Injectable (ADMELOG) 4 Unit(s) SubCutaneous three times a day before meals  saccharomyces boulardii 250 milliGRAM(s) Oral two times a day    MEDICATIONS  (PRN):  acetaminophen   Tablet .. 325 milliGRAM(s) Oral every 4 hours PRN Temp greater or equal to 38C (100.4F), Mild Pain (1 - 3)  benzocaine 15 mG/menthol 3.6 mG (Sugar-Free) Lozenge 1 Lozenge Oral every 8 hours PRN Sore Throat  HYDROmorphone  Injectable 0.5 milliGRAM(s) IV Push every 6 hours PRN Severe Pain (7 - 10)  morphine  - Injectable 2 milliGRAM(s) IV Push every 4 hours PRN Moderate Pain (4 - 6)        I&O's Summary    18 Jun 2021 07:01  -  19 Jun 2021 07:00  --------------------------------------------------------  IN: 0 mL / OUT: 1300 mL / NET: -1300 mL        PHYSICAL EXAM:  Vital Signs Last 24 Hrs  T(C): 36.8 (21 Jun 2021 12:35), Max: 37.1 (20 Jun 2021 21:41)  T(F): 98.3 (21 Jun 2021 12:35), Max: 98.8 (20 Jun 2021 21:41)  HR: 66 (21 Jun 2021 12:35) (64 - 76)  BP: 142/80 (21 Jun 2021 12:35) (110/67 - 142/80)  BP(mean): --  RR: 16 (21 Jun 2021 12:35) (16 - 18)  SpO2: 96% (21 Jun 2021 12:35) (94% - 96%)        CONSTITUTIONAL: NAD, well-developed, well-groomed  ENMT: Moist oral mucosa, good dentition, L paratoroid and surrounding skin with edema/induration, with drain in place after I&D with minimal serosanguineous dranage on dressing.   RESPIRATORY: Normal respiratory effort; lungs are clear to auscultation bilaterally  CARDIOVASCULAR: Regular rate and rhythm, normal S1 and S2, no murmur/rub/gallop; No lower extremity edema; Peripheral pulses are 2+ bilaterally  ABDOMEN: Nontender to palpation, normoactive bowel sounds, no rebound/guarding; No hepatosplenomegaly  MUSCLOSKELETAL:  no clubbing or cyanosis of digits; no joint swelling or tenderness to palpation  PSYCH: A+O to person, place, and time; affect appropriate  NEUROLOGY: CN 2-12 are intact and symmetric; no gross sensory deficits; was observed moving all 4 ext against gravity cooperating with exam.   SKIN: No rashes; no palpable lesions    LABS:                        13.6   10.72 )-----------( 260      ( 20 Jun 2021 07:04 )             41.2     06-20    136  |  99  |  16.1  ----------------------------<  247<H>  5.0   |  29.0  |  0.99    Ca    8.8      20 Jun 2021 07:04                CAPILLARY BLOOD GLUCOSE      POCT Blood Glucose.: 184 mg/dL (20 Jun 2021 11:21)  POCT Blood Glucose.: 189 mg/dL (20 Jun 2021 09:29)  POCT Blood Glucose.: 251 mg/dL (19 Jun 2021 23:00)  POCT Blood Glucose.: 124 mg/dL (19 Jun 2021 18:11)  POCT Blood Glucose.: 136 mg/dL (19 Jun 2021 16:20)        RADIOLOGY & ADDITIONAL TESTS:  Results Reviewed:   Imaging Personally Reviewed:  Electrocardiogram Personally Reviewed:

## 2021-06-21 NOTE — PROGRESS NOTE ADULT - SUBJECTIVE AND OBJECTIVE BOX
[facility]  INFECTIOUS DISEASES AND INTERNAL MEDICINE at [place]  =======================================================  Home Zuluaga MD [facility]   Minh Raymundo MD  Diplomates American Board of Internal Medicine and Infectious Diseases  Tel  [phone]  Fax [phone]  =======================================================    [id number]  [facility]   follow up left parotiditis    s/p OR incision and drainage on 6/19  cultures were not sent    he is reporting some numbness in the left face  no fevers      I have personally reviewed the labs and data; pertinent labs and data are listed in this note; please see below.   =======================================================  Past Medical & Surgical Hx:  =====================  PAST MEDICAL & SURGICAL HISTORY:  Diabetes mellitus    No significant past surgical history      Problem List:  ==========  HEALTH ISSUES - PROBLEM Dx:    Social Hx:  =======  no toxic habits currently    FAMILY HISTORY:  FHx: diabetes mellitus (Grandparent)    no significant family history of immunosuppressive disorders in mother or father   =======================================================    REVIEW OF SYSTEMS:  CONSTITUTIONAL:  No Fever or chills  HEENT:  as per HPI  CARDIOVASCULAR:  No pressure, squeezing, strangling, tightness, heaviness or aching about the chest, neck, axilla or epigastrium.  RESPIRATORY:  No cough, shortness of breath  GASTROINTESTINAL:  No nausea, vomiting or diarrhea.  GENITOURINARY:  No dysuria, frequency or urgency. No Blood in urine  MUSCULOSKELETAL:  no joint aches, no muscle pain  SKIN:  No change in skin, hair or nails.  NEUROLOGIC:  No Headaches, seizures or weakness.  PSYCHIATRIC:  No disorder of thought or mood.  ENDOCRINE:  No heat or cold intolerance  HEMATOLOGICAL:  No easy bruising or bleeding.    =======================================================  Allergies  No Known Allergies    ======================================================  Physical Exam:  ============    General:  No acute distress.  OBESE  Eye: Pupils are equal, round and reactive to light, Extraocular movements are intact, Normal conjunctiva.  HENT: Normocephalic, Oral mucosa is moist, No pharyngeal erythema, No sinus tenderness.  LEFT side of face with less erythema  Swelling is present,  surgical incision closed with sutures.   Penrose drain n place  FAIR Dentition  Neck: Supple, No lymphadenopathy in axillae  Respiratory: Lungs are clear to auscultation, Respirations are non-labored.  Cardiovascular: Normal rate, Regular rhythm,   Gastrointestinal: Soft, Non-tender, Non-distended, Normal bowel sounds.  Genitourinary: No costovertebral angle tenderness.  Lymphatics: No lymphadenopathy neck,   Musculoskeletal: Normal range of motion, Normal strength.  Integumentary: No rash.  Neurologic: Alert, Oriented, No focal deficits, Cranial Nerves II-XII are grossly intact.  Psychiatric: Appropriate mood & affect.    =======================================================     Vitals:  ============  T(F): 98.1 (21 Jun 2021 04:39), Max: 98.8 (20 Jun 2021 21:41)  HR: 64 (21 Jun 2021 04:39)  BP: 119/72 (21 Jun 2021 04:39)  RR: 17 (21 Jun 2021 04:39)  SpO2: 95% (21 Jun 2021 04:39) (94% - 95%)  temp max in last 48H T(F): , Max: 98.8 (06-20-21 @ 21:41)    =======================================================  Current Antibiotics:  amoxicillin  875 milliGRAM(s)/clavulanate 1 Tablet(s) Oral two times a day    Other medications:  dextrose 40% Gel 15 Gram(s) Oral once  dextrose 5%. 1000 milliLiter(s) IV Continuous <Continuous>  dextrose 5%. 1000 milliLiter(s) IV Continuous <Continuous>  dextrose 50% Injectable 25 Gram(s) IV Push once  dextrose 50% Injectable 12.5 Gram(s) IV Push once  dextrose 50% Injectable 25 Gram(s) IV Push once  glucagon  Injectable 1 milliGRAM(s) IntraMuscular once  heparin   Injectable 5000 Unit(s) SubCutaneous every 12 hours  insulin glargine Injectable (LANTUS) 20 Unit(s) SubCutaneous at bedtime  insulin lispro (ADMELOG) corrective regimen sliding scale   SubCutaneous three times a day before meals  insulin lispro Injectable (ADMELOG) 4 Unit(s) SubCutaneous three times a day before meals  saccharomyces boulardii 250 milliGRAM(s) Oral two times a day      =======================================================  Labs:                        13.2   9.81  )-----------( 239      ( 21 Jun 2021 07:08 )             40.7     06-21    137  |  101  |  17.9  ----------------------------<  153<H>  4.2   |  27.0  |  0.95    Ca    8.9      21 Jun 2021 07:08        Culture - Blood (collected 06-16-21 @ 07:23)  Source: .Blood Blood  Final Report (06-21-21 @ 09:00):    No growth at 5 days.    Culture - Blood (collected 06-16-21 @ 06:45)  Source: .Blood Blood  Final Report (06-21-21 @ 07:00):    No growth at 5 days.    Culture - Blood (collected 06-12-21 @ 23:48)  Source: .Blood Blood  Final Report (06-18-21 @ 01:00):    No growth at 5 days.    Culture - Blood (collected 06-12-21 @ 23:48)  Source: .Blood Blood  Final Report (06-18-21 @ 01:00):    No growth at 5 days.        COVID-19 PCR: NotDetec (06-16-21 @ 12:35)         < from: CT Neck Soft Tissue w/ IV Cont (06.16.21 @ 21:39) >     EXAM:  CT NECK SOFT TISSUE IC                          PROCEDURE DATE:  06/16/2021          INTERPRETATION:  CLINICAL INFORMATION: 52 yo male no PMHx presents to ED c/o left sided jaw pain x4 days. Patient presented 6/12, diagnosed with parotitis.Presented to ENT specialist next day, abx changed from Augmentin to Clindamycin. Patient states pain and swelling is worsening. Difficulty opening mouth. Last medicated with either ibuprofen or acetaminophen 2 hours PTA (unsure which). No further complaints at this time. Denies fever, difficulty breathing.    TECHNIQUE: CT soft tissue neck.  Transaxial images were acquired from the skull base through to the aortic arch at the ministration of IV contrast. Coronal and sagittal reformations were also obtained.  98 mls of Omnipaque 300 were administered intravenously without untoward event. 2 mls discarded.    COMPARISON: None.    FINDINGS:  There is increased inflammatory-type change in the left face and neck subcutaneous fat as well as new inflammatory change in the right lower neck subcutaneous fat. Increased inflammatory changes also seen in the left submandibular fat and now within the right submandibular fat. There is reactive thickening of the platysma muscles, increased on the left and new on the right. This appears to be related to worsening inflammation of the left parotid gland and new inflammatory changes in the submandibular glands. No sialolith or ductal dilatation. The right parotid gland is unremarkable. There is reactive edema with enlargement of the left masseter muscle.    There is new mild reactive edema in the left pharyngeal wall. The aerodigestive tract is otherwise unremarkable without masslike asymmetry or nodularity.    Evaluation of the various lesvia stations again demonstrate mildly enlarged left level 2A and B lymph nodes, the largest being a left to a jugulodigastric node measuring up to 2.1 cm.    The thyroid gland is unremarkable.    The carotid and vertebral arteries are patent.    The visualized intracranial and intraorbital compartments are unremarkable.    There is no lucent or sclerotic lesion.    The visualized lung apices are clear.    IMPRESSION:  Worsening sialoadenitis which can be infectious or inflammatory in etiology.  Stable left cervical adenopathy, presumably reactive.         POONAM GARCIA MD; Attending Radiologist  This document has been electronically signed. Jun 17 2021  2:57AM    < end of copied text >

## 2021-06-21 NOTE — PROGRESS NOTE ADULT - ASSESSMENT
51 M with uncontrolled DM presenting with worsening parotitis, was admitted and started on Meropenem and Clyndamycin. ID and ENT team were consulted. Repeat CT on 06/19 with worsening of phlegmona and developing abscess. He went to OR on 06/19 fro I&D by ENT.       DM uncontrolled 1c 7.8% Pt stats he refused medications in past, has tried diet control  - continue  Lantus 20 and Lispro 4 units tid with meals and ssi  - fiingerstick ac tid hs    Severe parotitis, s/p I&D on 06/19  Continue abx  On Dilaudid for pain   follow ENT and ID consults      Hypokalemia / Hypocalcemia   replace K and Ca   51 M with uncontrolled DM presenting with worsening parotitis, was admitted and started on Meropenem and Clyndamycin. ID and ENT team were consulted.   Repeat CT on 06/19 with worsening of phlegmona and developing abscess. He went to OR on 06/19 fro I&D by ENT.       DM uncontrolled :A1c 7.8% Pt states he refused medications in past, has tried diet control  - continue  Lantus 20 and Lispro 4 units tid with meals and ssi  - fiingerstick ac tid hs    Severe parotitis, s/p I&D on 06/19  Continue abx  On Dilaudid for pain   follow ENT and ID consults      Hypokalemia / Hypocalcemia   replace K and Ca

## 2021-06-22 LAB
GLUCOSE BLDC GLUCOMTR-MCNC: 124 MG/DL — HIGH (ref 70–99)
GLUCOSE BLDC GLUCOMTR-MCNC: 148 MG/DL — HIGH (ref 70–99)
GLUCOSE BLDC GLUCOMTR-MCNC: 149 MG/DL — HIGH (ref 70–99)
GLUCOSE BLDC GLUCOMTR-MCNC: 156 MG/DL — HIGH (ref 70–99)

## 2021-06-22 PROCEDURE — 99232 SBSQ HOSP IP/OBS MODERATE 35: CPT

## 2021-06-22 RX ORDER — DIPHENHYDRAMINE HCL 50 MG
25 CAPSULE ORAL ONCE
Refills: 0 | Status: COMPLETED | OUTPATIENT
Start: 2021-06-22 | End: 2021-06-22

## 2021-06-22 RX ORDER — OXYCODONE AND ACETAMINOPHEN 5; 325 MG/1; MG/1
2 TABLET ORAL EVERY 4 HOURS
Refills: 0 | Status: DISCONTINUED | OUTPATIENT
Start: 2021-06-22 | End: 2021-06-23

## 2021-06-22 RX ORDER — OXYCODONE AND ACETAMINOPHEN 5; 325 MG/1; MG/1
1 TABLET ORAL EVERY 4 HOURS
Refills: 0 | Status: DISCONTINUED | OUTPATIENT
Start: 2021-06-22 | End: 2021-06-23

## 2021-06-22 RX ADMIN — Medication 1 TABLET(S): at 06:42

## 2021-06-22 RX ADMIN — Medication 250 MILLIGRAM(S): at 06:42

## 2021-06-22 RX ADMIN — Medication 1 TABLET(S): at 18:17

## 2021-06-22 RX ADMIN — Medication 4 UNIT(S): at 08:16

## 2021-06-22 RX ADMIN — Medication 4 UNIT(S): at 16:47

## 2021-06-22 RX ADMIN — MORPHINE SULFATE 2 MILLIGRAM(S): 50 CAPSULE, EXTENDED RELEASE ORAL at 06:42

## 2021-06-22 RX ADMIN — Medication 250 MILLIGRAM(S): at 18:17

## 2021-06-22 RX ADMIN — MORPHINE SULFATE 2 MILLIGRAM(S): 50 CAPSULE, EXTENDED RELEASE ORAL at 00:31

## 2021-06-22 RX ADMIN — OXYCODONE AND ACETAMINOPHEN 1 TABLET(S): 5; 325 TABLET ORAL at 22:54

## 2021-06-22 RX ADMIN — INSULIN GLARGINE 20 UNIT(S): 100 INJECTION, SOLUTION SUBCUTANEOUS at 22:54

## 2021-06-22 RX ADMIN — HEPARIN SODIUM 5000 UNIT(S): 5000 INJECTION INTRAVENOUS; SUBCUTANEOUS at 06:42

## 2021-06-22 RX ADMIN — Medication 1: at 11:48

## 2021-06-22 RX ADMIN — HEPARIN SODIUM 5000 UNIT(S): 5000 INJECTION INTRAVENOUS; SUBCUTANEOUS at 18:17

## 2021-06-22 RX ADMIN — Medication 25 MILLIGRAM(S): at 22:54

## 2021-06-22 RX ADMIN — Medication 4 UNIT(S): at 11:48

## 2021-06-22 NOTE — PROGRESS NOTE ADULT - ASSESSMENT
51 M with uncontrolled DM presenting with worsening parotitis, was admitted and started on Meropenem and Clyndamycin. ID and ENT team were consulted.   Repeat CT on 06/19 with worsening of phlegmona and developing abscess. He went to OR on 06/19 fro I&D by ENT.       DM uncontrolled :A1c 7.8% Pt states he refused medications in past, has tried diet control  - continue  Lantus 20 and Lispro 4 units tid with meals and ssi  - fiingerstick ac tid hs    Severe parotitis, s/p I&D on 06/19  Continue abx  On Dilaudid for pain   follow ENT and ID consults      Hypokalemia / Hypocalcemia   replace K and Ca   51 M with uncontrolled DM presenting with worsening parotitis, was admitted and started on Meropenem and Clyndamycin. ID and ENT team were consulted.   Repeat CT on 06/19 with worsening of phlegmona and developing abscess. He went to OR on 06/19 fro I&D by ENT.       DM uncontrolled :A1c 7.8% Pt states he refused medications in past, has tried diet control  - continue  Lantus 20 and Lispro 4 units tid with meals and ssi  - fiingerstick ac tid hs  -When ready to discharge, may send christiano on oral meds like metformin  1000 mg bid, glipizide 5 mg bid with meals, and lantus daily.     Severe parotitis, s/p I&D on 06/19  Continue abx  On Dilaudid for pain   follow ENT and ID consults      Hypokalemia / Hypocalcemia   replace K and Ca

## 2021-06-22 NOTE — PROGRESS NOTE ADULT - SUBJECTIVE AND OBJECTIVE BOX
Patient with much less pain. Left facial swelling decreasing. Dressing last changed this am.    VS. Afebrile.    Dressing with scant serous drainage. Penrose in place. Significant left parotid enlargement, but reportedly decreasing.    Imp: Doing well, improving, little drainage now.    Plan: I removed penrose drain and redressed. Change dressing prn. Continue present Abx.

## 2021-06-22 NOTE — PROGRESS NOTE ADULT - SUBJECTIVE AND OBJECTIVE BOX
[facility] Division of Hospital Medicine    Chief Complaint:  parotitis     SUBJECTIVE: no complains this morning    OVERNIGHT EVENTS: none reported    Patient denies chest pain, SOB, abd pain, N/V, fever, chills, dysuria or any other complaints. All remainder ROS negative.     MEDICATIONS  (STANDING):  amoxicillin  875 milliGRAM(s)/clavulanate 1 Tablet(s) Oral two times a day  dextrose 40% Gel 15 Gram(s) Oral once  dextrose 5%. 1000 milliLiter(s) (50 mL/Hr) IV Continuous <Continuous>  dextrose 5%. 1000 milliLiter(s) (100 mL/Hr) IV Continuous <Continuous>  dextrose 50% Injectable 25 Gram(s) IV Push once  dextrose 50% Injectable 12.5 Gram(s) IV Push once  dextrose 50% Injectable 25 Gram(s) IV Push once  glucagon  Injectable 1 milliGRAM(s) IntraMuscular once  heparin   Injectable 5000 Unit(s) SubCutaneous every 12 hours  insulin glargine Injectable (LANTUS) 20 Unit(s) SubCutaneous at bedtime  insulin lispro (ADMELOG) corrective regimen sliding scale   SubCutaneous three times a day before meals  insulin lispro Injectable (ADMELOG) 4 Unit(s) SubCutaneous three times a day before meals  saccharomyces boulardii 250 milliGRAM(s) Oral two times a day    MEDICATIONS  (PRN):  acetaminophen   Tablet .. 325 milliGRAM(s) Oral every 4 hours PRN Temp greater or equal to 38C (100.4F), Mild Pain (1 - 3)  benzocaine 15 mG/menthol 3.6 mG (Sugar-Free) Lozenge 1 Lozenge Oral every 8 hours PRN Sore Throat  oxycodone    5 mG/acetaminophen 325 mG 1 Tablet(s) Oral every 4 hours PRN Moderate Pain (4 - 6)  oxycodone    5 mG/acetaminophen 325 mG 2 Tablet(s) Oral every 4 hours PRN Severe Pain (7 - 10)        I&O's Summary    18 Jun 2021 07:01  -  19 Jun 2021 07:00  --------------------------------------------------------  IN: 0 mL / OUT: 1300 mL / NET: -1300 mL        PHYSICAL EXAM:  Vital Signs Last 24 Hrs  T(C): 36.8 (22 Jun 2021 04:49), Max: 37 (21 Jun 2021 18:41)  T(F): 98.3 (22 Jun 2021 04:49), Max: 98.6 (21 Jun 2021 18:41)  HR: 62 (22 Jun 2021 04:49) (62 - 68)  BP: 113/76 (22 Jun 2021 04:49) (113/75 - 142/80)  BP(mean): --  RR: 18 (22 Jun 2021 04:49) (16 - 18)  SpO2: 96% (22 Jun 2021 04:49) (93% - 96%)        CONSTITUTIONAL: NAD, well-developed, well-groomed  ENMT: Moist oral mucosa, good dentition, L paratoroid and surrounding skin with edema/induration, with drain in place after I&D with minimal serosanguineous dranage on dressing.   RESPIRATORY: Normal respiratory effort; lungs are clear to auscultation bilaterally  CARDIOVASCULAR: Regular rate and rhythm, normal S1 and S2, no murmur/rub/gallop; No lower extremity edema; Peripheral pulses are 2+ bilaterally  ABDOMEN: Nontender to palpation, normoactive bowel sounds, no rebound/guarding; No hepatosplenomegaly  MUSCLOSKELETAL:  no clubbing or cyanosis of digits; no joint swelling or tenderness to palpation  PSYCH: A+O to person, place, and time; affect appropriate  NEUROLOGY: CN 2-12 are intact and symmetric; no gross sensory deficits; was observed moving all 4 ext against gravity cooperating with exam.   SKIN: No rashes; no palpable lesions    LABS:                        13.2   9.81  )-----------( 239      ( 21 Jun 2021 07:08 )             40.7     06-21    137  |  101  |  17.9  ----------------------------<  153<H>  4.2   |  27.0  |  0.95    Ca    8.9      21 Jun 2021 07:08                CAPILLARY BLOOD GLUCOSE      POCT Blood Glucose.: 124 mg/dL (22 Jun 2021 07:43)  POCT Blood Glucose.: 127 mg/dL (21 Jun 2021 21:30)  POCT Blood Glucose.: 170 mg/dL (21 Jun 2021 16:17)  POCT Blood Glucose.: 113 mg/dL (21 Jun 2021 11:12)        RADIOLOGY & ADDITIONAL TESTS:  Results Reviewed:   Imaging Personally Reviewed:  Electrocardiogram Personally Reviewed:

## 2021-06-22 NOTE — PROGRESS NOTE ADULT - ASSESSMENT
This 51 year old male with DM (has refused medications, attempting to diet control) presents with increasing pain over left jaw. Reports that initially started as a lump at angle of jaw last Tuesday. Swelling continued and was associated with increasing pain in the region for which he presented to the ER and was prescribed Augmentin and referred to ENT. Was advised by ED to stat warm compresses nd abx changed to clindamycin. States that had very mild improvement in swelling and pain transiently for a day, however last Monday has started increasing in size again Now extremely painful.   Subjectively reports feeling warm with possible rigors.   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   No abdominal pain, changes in gastrointestinal or genitourinary habits, no testicular pain or swelling.   (16 Jun 2021 13:08)    patient denies facial trauma.  was seen in ER prior to this, given antibiotics and sucking candy w/o improvement.   patient started on IV Zosyn by medical team.       Impression:  parotiditis  WBC elevation  diabetes    Plan:  s/p incision and drainage  passive drain in place    clinically stable/ improving  continue Augmentin 875mg PO BID      awaiting for drain removal        WBC elevation is reactive, now normalized  WBC Count: 9.81 K/uL (06-21-21 @ 07:08)  WBC Count: 10.72 K/uL (06-20-21 @ 07:04)  WBC Count: 7.49 K/uL (06-19-21 @ 08:58)  WBC Count: 9.10 K/uL (06-18-21 @ 07:39)  WBC Count: 10.71 K/uL (06-17-21 @ 07:33)     Regarding Diabetes  - needs adequate control for wound healing  - suboptimal control: A1C with Estimated Average Glucose Result: 7.8 % (06-17-21 @ 07:33)  -  ENDOCRINE inpt appreciated.   Planned for eventual home regiment of PO and injected therapy.

## 2021-06-22 NOTE — CHART NOTE - NSCHARTNOTEFT_GEN_A_CORE
Pt seen and examined at bedside for complaint of pruritic rash to back and shoulders. Resting comfortably in NAD. Endorsing mild itching to site. Denies chest pain, SOB, dysphagia, blurry vision, headaches, palpitations, dizziness. Denies any allergies to drugs or food. All vital signs are stable. On exam pt with erythematous, macular rash covering entirety of his back and shoulders. Appears to be contact dermatitis ?  25mg PO benadryl given with improvement in symptoms. Continue to monitor.

## 2021-06-22 NOTE — PROGRESS NOTE ADULT - SUBJECTIVE AND OBJECTIVE BOX
INTERVAL HPI/OVERNIGHT EVENTS: follow up of diabetes, no events over night    MEDICATIONS  (STANDING):  dextrose 40% Gel 15 Gram(s) Oral once  dextrose 5%. 1000 milliLiter(s) (50 mL/Hr) IV Continuous <Continuous>  dextrose 5%. 1000 milliLiter(s) (100 mL/Hr) IV Continuous <Continuous>  dextrose 50% Injectable 25 Gram(s) IV Push once  dextrose 50% Injectable 12.5 Gram(s) IV Push once  dextrose 50% Injectable 25 Gram(s) IV Push once  glucagon  Injectable 1 milliGRAM(s) IntraMuscular once  heparin   Injectable 5000 Unit(s) SubCutaneous every 12 hours  insulin glargine Injectable (LANTUS) 20 Unit(s) SubCutaneous at bedtime  insulin lispro (ADMELOG) corrective regimen sliding scale   SubCutaneous three times a day before meals  insulin lispro Injectable (ADMELOG) 3 Unit(s) SubCutaneous three times a day before meals  meropenem  IVPB 1000 milliGRAM(s) IV Intermittent every 8 hours  saccharomyces boulardii 250 milliGRAM(s) Oral two times a day    MEDICATIONS  (PRN):  acetaminophen   Tablet .. 325 milliGRAM(s) Oral every 4 hours PRN Temp greater or equal to 38C (100.4F), Mild Pain (1 - 3)  HYDROmorphone  Injectable 0.5 milliGRAM(s) IV Push every 6 hours PRN Severe Pain (7 - 10)  morphine  - Injectable 2 milliGRAM(s) IV Push every 4 hours PRN Moderate Pain (4 - 6)      Allergies    No Known Allergies    Intolerances        Review of systems: eating well, no complains, no n/v, no CP, no SOB, no pain    Vital Signs Last 24 Hrs  T(C): 36.6 (20 Jun 2021 11:03), Max: 37 (19 Jun 2021 17:50)  T(F): 97.8 (20 Jun 2021 11:03), Max: 98.6 (19 Jun 2021 17:50)  HR: 67 (20 Jun 2021 11:03) (49 - 77)  BP: 122/69 (20 Jun 2021 11:03) (122/69 - 164/88)  BP(mean): --  RR: 18 (20 Jun 2021 11:03) (11 - 20)  SpO2: 91% (20 Jun 2021 11:03) (91% - 100%)    CONSTITUTIONAL: NAD, well-developed, well-groomed  RESPIRATORY: Normal respiratory effort, clear to auscultation bilaterally  CARDIOVASCULAR:RRR normal S1 and S2, no murmur/rub/gallop; No lower extremity edema  ABDOMEN: Nontender to palpation, normal bowel sounds  PSYCH: A+O to person, place, and time; affect appropriate  SKIN: No rashes; no palpable lesions        LABS:                        13.6   10.72 )-----------( 260      ( 20 Jun 2021 07:04 )             41.2     06-20    136  |  99  |  16.1  ----------------------------<  247<H>  5.0   |  29.0  |  0.99    Ca    8.8      20 Jun 2021 07:04      POCT Blood Glucose.: 184 mg/dL (06-20-21 @ 11:21)  POCT Blood Glucose.: 189 mg/dL (06-20-21 @ 09:29)  POCT Blood Glucose.: 251 mg/dL (06-19-21 @ 23:00)  POCT Blood Glucose.: 124 mg/dL (06-19-21 @ 18:11)  POCT Blood Glucose.: 136 mg/dL (06-19-21 @ 16:20)  POCT Blood Glucose.: 148 mg/dL (06-19-21 @ 11:28)  POCT Blood Glucose.: 165 mg/dL (06-19-21 @ 08:04)  POCT Blood Glucose.: 223 mg/dL (06-18-21 @ 21:38)  POCT Blood Glucose.: 171 mg/dL (06-18-21 @ 16:56)  POCT Blood Glucose.: 161 mg/dL (06-18-21 @ 11:27)  POCT Blood Glucose.: 157 mg/dL (06-18-21 @ 08:10)  POCT Blood Glucose.: 181 mg/dL (06-17-21 @ 21:17)  POCT Blood Glucose.: 185 mg/dL (06-17-21 @ 17:12)

## 2021-06-22 NOTE — PROGRESS NOTE ADULT - ASSESSMENT
51 M with uncontrolled DM presenting with worsening parotitis, was admitted and started on Meropenem and Clyndamycin. ID and ENT team were consulted. Repeat CT on 06/19 with worsening of phlegmona and developing abscess. He went to OR on 06/19 fro I&D by ENT. He slowly improving.     #Severe parotitis, s/p I&D on 06/19  - Continue Augmentin bid as per ID recomendation  -changesd to percocet 1 and 2 tab base on pain scale  - Appreciate ENT and ID consults    DM uncontrolled   - A1c 7.8 Pt stats has refused medications in past, has tried diet control  - c/w Lantus 20 and Lispro 4 units doses   - Endo consult appreciated  - pt may go on oral medication on dishcarge ( he was counseled extensively about need to keep diet and take medication regularly )      DVT ppx: Heparin s/c Q 12, pt ambulatory   Dispo - guided by ENT recommendations, still with drain in place

## 2021-06-22 NOTE — PROVIDER CONTACT NOTE (OTHER) - BACKGROUND
79 years old female admitted for SOB, respiratory distress with history of heart attack, HTN, HLD. DNR/DNI

## 2021-06-22 NOTE — PROGRESS NOTE ADULT - SUBJECTIVE AND OBJECTIVE BOX
[facility]  INFECTIOUS DISEASES AND INTERNAL MEDICINE at [place]  =======================================================  Home Raymundo MD  Diplomates American Board of Internal Medicine and Infectious Diseases  Tel  [phone]  Fax [phone]  =======================================================    [id number]  [facility]   follow up left parotiditis    less swelling on left side of face  still with drain in place.   no fevers    I have personally reviewed the labs and data; pertinent labs and data are listed in this note; please see below.   =======================================================  Past Medical & Surgical Hx:  =====================  PAST MEDICAL & SURGICAL HISTORY:  Diabetes mellitus    No significant past surgical history      Problem List:  ==========  HEALTH ISSUES - PROBLEM Dx:    Social Hx:  =======  no toxic habits currently    FAMILY HISTORY:  FHx: diabetes mellitus (Grandparent)    no significant family history of immunosuppressive disorders in mother or father   =======================================================    REVIEW OF SYSTEMS:  CONSTITUTIONAL:  No Fever or chills  HEENT:  as per HPI  CARDIOVASCULAR:  No pressure, squeezing, strangling, tightness, heaviness or aching about the chest, neck, axilla or epigastrium.  RESPIRATORY:  No cough, shortness of breath  GASTROINTESTINAL:  No nausea, vomiting or diarrhea.  GENITOURINARY:  No dysuria, frequency or urgency. No Blood in urine  MUSCULOSKELETAL:  no joint aches, no muscle pain  SKIN:  No change in skin, hair or nails.  NEUROLOGIC:  No Headaches, seizures or weakness.  PSYCHIATRIC:  No disorder of thought or mood.  ENDOCRINE:  No heat or cold intolerance  HEMATOLOGICAL:  No easy bruising or bleeding.    =======================================================  Allergies  No Known Allergies    ======================================================  Physical Exam:  ============    General:  No acute distress.  OBESE  Eye: Pupils are equal, round and reactive to light, Extraocular movements are intact, Normal conjunctiva.  HENT: Normocephalic, Oral mucosa is moist, No pharyngeal erythema, No sinus tenderness.  LEFT side of face with less erythema  Swelling is present,  surgical incision closed with sutures.   Penrose drain n place  FAIR Dentition  Neck: Supple, No lymphadenopathy in axillae  Respiratory: Lungs are clear to auscultation, Respirations are non-labored.  Cardiovascular: Normal rate, Regular rhythm,   Gastrointestinal: Soft, Non-tender, Non-distended, Normal bowel sounds.  Genitourinary: No costovertebral angle tenderness.  Lymphatics: No lymphadenopathy neck,   Musculoskeletal: Normal range of motion, Normal strength.  Integumentary: No rash.  Neurologic: Alert, Oriented, No focal deficits, Cranial Nerves II-XII are grossly intact.  Psychiatric: Appropriate mood & affect.    =======================================================   Vitals:  ============  T(F): 98.3 (22 Jun 2021 04:49), Max: 98.6 (21 Jun 2021 18:41)  HR: 62 (22 Jun 2021 04:49)  BP: 113/76 (22 Jun 2021 04:49)  RR: 18 (22 Jun 2021 04:49)  SpO2: 96% (22 Jun 2021 04:49) (93% - 96%)  temp max in last 48H T(F): , Max: 98.8 (06-20-21 @ 21:41)    =======================================================  Current Antibiotics:  amoxicillin  875 milliGRAM(s)/clavulanate 1 Tablet(s) Oral two times a day    Other medications:  dextrose 40% Gel 15 Gram(s) Oral once  dextrose 5%. 1000 milliLiter(s) IV Continuous <Continuous>  dextrose 5%. 1000 milliLiter(s) IV Continuous <Continuous>  dextrose 50% Injectable 25 Gram(s) IV Push once  dextrose 50% Injectable 12.5 Gram(s) IV Push once  dextrose 50% Injectable 25 Gram(s) IV Push once  glucagon  Injectable 1 milliGRAM(s) IntraMuscular once  heparin   Injectable 5000 Unit(s) SubCutaneous every 12 hours  insulin glargine Injectable (LANTUS) 20 Unit(s) SubCutaneous at bedtime  insulin lispro (ADMELOG) corrective regimen sliding scale   SubCutaneous three times a day before meals  insulin lispro Injectable (ADMELOG) 4 Unit(s) SubCutaneous three times a day before meals  saccharomyces boulardii 250 milliGRAM(s) Oral two times a day      =======================================================  Labs:                        13.2   9.81  )-----------( 239      ( 21 Jun 2021 07:08 )             40.7     06-21    137  |  101  |  17.9  ----------------------------<  153<H>  4.2   |  27.0  |  0.95    Ca    8.9      21 Jun 2021 07:08        Culture - Blood (collected 06-16-21 @ 07:23)  Source: .Blood Blood  Final Report (06-21-21 @ 09:00):    No growth at 5 days.    Culture - Blood (collected 06-16-21 @ 06:45)  Source: .Blood Blood  Final Report (06-21-21 @ 07:00):    No growth at 5 days.    Culture - Blood (collected 06-12-21 @ 23:48)  Source: .Blood Blood  Final Report (06-18-21 @ 01:00):    No growth at 5 days.    Culture - Blood (collected 06-12-21 @ 23:48)  Source: .Blood Blood  Final Report (06-18-21 @ 01:00):    No growth at 5 days.      COVID-19 PCR: NotDetec (06-16-21 @ 12:35)          < from: CT Neck Soft Tissue w/ IV Cont (06.16.21 @ 21:39) >     EXAM:  CT NECK SOFT TISSUE IC                          PROCEDURE DATE:  06/16/2021          INTERPRETATION:  CLINICAL INFORMATION: 52 yo male no PMHx presents to ED c/o left sided jaw pain x4 days. Patient presented 6/12, diagnosed with parotitis.Presented to ENT specialist next day, abx changed from Augmentin to Clindamycin. Patient states pain and swelling is worsening. Difficulty opening mouth. Last medicated with either ibuprofen or acetaminophen 2 hours PTA (unsure which). No further complaints at this time. Denies fever, difficulty breathing.    TECHNIQUE: CT soft tissue neck.  Transaxial images were acquired from the skull base through to the aortic arch at the ministration of IV contrast. Coronal and sagittal reformations were also obtained.  98 mls of Omnipaque 300 were administered intravenously without untoward event. 2 mls discarded.    COMPARISON: None.    FINDINGS:  There is increased inflammatory-type change in the left face and neck subcutaneous fat as well as new inflammatory change in the right lower neck subcutaneous fat. Increased inflammatory changes also seen in the left submandibular fat and now within the right submandibular fat. There is reactive thickening of the platysma muscles, increased on the left and new on the right. This appears to be related to worsening inflammation of the left parotid gland and new inflammatory changes in the submandibular glands. No sialolith or ductal dilatation. The right parotid gland is unremarkable. There is reactive edema with enlargement of the left masseter muscle.    There is new mild reactive edema in the left pharyngeal wall. The aerodigestive tract is otherwise unremarkable without masslike asymmetry or nodularity.    Evaluation of the various lesvia stations again demonstrate mildly enlarged left level 2A and B lymph nodes, the largest being a left to a jugulodigastric node measuring up to 2.1 cm.    The thyroid gland is unremarkable.    The carotid and vertebral arteries are patent.    The visualized intracranial and intraorbital compartments are unremarkable.    There is no lucent or sclerotic lesion.    The visualized lung apices are clear.    IMPRESSION:  Worsening sialoadenitis which can be infectious or inflammatory in etiology.  Stable left cervical adenopathy, presumably reactive.         POONAM GARCIA MD; Attending Radiologist  This document has been electronically signed. Jun 17 2021  2:57AM    < end of copied text >

## 2021-06-23 ENCOUNTER — TRANSCRIPTION ENCOUNTER (OUTPATIENT)
Age: 51
End: 2021-06-23

## 2021-06-23 VITALS
SYSTOLIC BLOOD PRESSURE: 127 MMHG | RESPIRATION RATE: 18 BRPM | OXYGEN SATURATION: 96 % | DIASTOLIC BLOOD PRESSURE: 65 MMHG | TEMPERATURE: 98 F | HEART RATE: 70 BPM

## 2021-06-23 LAB
GLUCOSE BLDC GLUCOMTR-MCNC: 108 MG/DL — HIGH (ref 70–99)
GLUCOSE BLDC GLUCOMTR-MCNC: 139 MG/DL — HIGH (ref 70–99)

## 2021-06-23 PROCEDURE — 80048 BASIC METABOLIC PNL TOTAL CA: CPT

## 2021-06-23 PROCEDURE — 86850 RBC ANTIBODY SCREEN: CPT

## 2021-06-23 PROCEDURE — 85025 COMPLETE CBC W/AUTO DIFF WBC: CPT

## 2021-06-23 PROCEDURE — 82962 GLUCOSE BLOOD TEST: CPT

## 2021-06-23 PROCEDURE — 70491 CT SOFT TISSUE NECK W/DYE: CPT

## 2021-06-23 PROCEDURE — 86901 BLOOD TYPING SEROLOGIC RH(D): CPT

## 2021-06-23 PROCEDURE — U0005: CPT

## 2021-06-23 PROCEDURE — 83605 ASSAY OF LACTIC ACID: CPT

## 2021-06-23 PROCEDURE — 96375 TX/PRO/DX INJ NEW DRUG ADDON: CPT

## 2021-06-23 PROCEDURE — 86769 SARS-COV-2 COVID-19 ANTIBODY: CPT

## 2021-06-23 PROCEDURE — 99239 HOSP IP/OBS DSCHRG MGMT >30: CPT

## 2021-06-23 PROCEDURE — 86900 BLOOD TYPING SEROLOGIC ABO: CPT

## 2021-06-23 PROCEDURE — 99232 SBSQ HOSP IP/OBS MODERATE 35: CPT

## 2021-06-23 PROCEDURE — 96374 THER/PROPH/DIAG INJ IV PUSH: CPT

## 2021-06-23 PROCEDURE — 70487 CT MAXILLOFACIAL W/DYE: CPT

## 2021-06-23 PROCEDURE — U0003: CPT

## 2021-06-23 PROCEDURE — 36415 COLL VENOUS BLD VENIPUNCTURE: CPT

## 2021-06-23 PROCEDURE — 85027 COMPLETE CBC AUTOMATED: CPT

## 2021-06-23 PROCEDURE — 80053 COMPREHEN METABOLIC PANEL: CPT

## 2021-06-23 PROCEDURE — 87040 BLOOD CULTURE FOR BACTERIA: CPT

## 2021-06-23 PROCEDURE — 83036 HEMOGLOBIN GLYCOSYLATED A1C: CPT

## 2021-06-23 PROCEDURE — 76536 US EXAM OF HEAD AND NECK: CPT

## 2021-06-23 PROCEDURE — 99285 EMERGENCY DEPT VISIT HI MDM: CPT | Mod: 25

## 2021-06-23 RX ORDER — ISOPROPYL ALCOHOL, BENZOCAINE .7; .06 ML/ML; ML/ML
1 SWAB TOPICAL
Qty: 100 | Refills: 1
Start: 2021-06-23 | End: 2021-08-11

## 2021-06-23 RX ORDER — SACCHAROMYCES BOULARDII 250 MG
1 POWDER IN PACKET (EA) ORAL
Qty: 24 | Refills: 0
Start: 2021-06-23 | End: 2021-07-04

## 2021-06-23 RX ORDER — METFORMIN HYDROCHLORIDE 850 MG/1
1 TABLET ORAL
Qty: 60 | Refills: 0
Start: 2021-06-23 | End: 2021-07-22

## 2021-06-23 RX ADMIN — Medication 250 MILLIGRAM(S): at 08:04

## 2021-06-23 RX ADMIN — Medication 1 TABLET(S): at 06:53

## 2021-06-23 RX ADMIN — Medication 4 UNIT(S): at 11:30

## 2021-06-23 RX ADMIN — HEPARIN SODIUM 5000 UNIT(S): 5000 INJECTION INTRAVENOUS; SUBCUTANEOUS at 06:53

## 2021-06-23 RX ADMIN — Medication 4 UNIT(S): at 08:03

## 2021-06-23 NOTE — PROGRESS NOTE ADULT - PROVIDER SPECIALTY LIST ADULT
ENT
ENT
Endocrinology
ENT
Endocrinology
Infectious Disease
ENT
Hospitalist
ENT
ENT
Endocrinology
Endocrinology
Hospitalist
Infectious Disease
Infectious Disease
Endocrinology
Hospitalist

## 2021-06-23 NOTE — PROGRESS NOTE ADULT - SUBJECTIVE AND OBJECTIVE BOX
[facility]  INFECTIOUS DISEASES AND INTERNAL MEDICINE at [place]  =======================================================  Home Raymundo MD  Diplomates American Board of Internal Medicine and Infectious Diseases  Tel  [phone]  Fax [phone]  =======================================================    [id number]  [facility]   follow up left parotiditis    drain removed    feeling better      I have personally reviewed the labs and data; pertinent labs and data are listed in this note; please see below.   =======================================================  Past Medical & Surgical Hx:  =====================  PAST MEDICAL & SURGICAL HISTORY:  Diabetes mellitus    No significant past surgical history      Problem List:  ==========  HEALTH ISSUES - PROBLEM Dx:    Social Hx:  =======  no toxic habits currently    FAMILY HISTORY:  FHx: diabetes mellitus (Grandparent)    no significant family history of immunosuppressive disorders in mother or father   =======================================================    REVIEW OF SYSTEMS:  CONSTITUTIONAL:  No Fever or chills  HEENT:  as per HPI  CARDIOVASCULAR:  No pressure, squeezing, strangling, tightness, heaviness or aching about the chest, neck, axilla or epigastrium.  RESPIRATORY:  No cough, shortness of breath  GASTROINTESTINAL:  No nausea, vomiting or diarrhea.  GENITOURINARY:  No dysuria, frequency or urgency. No Blood in urine  MUSCULOSKELETAL:  no joint aches, no muscle pain  SKIN:  No change in skin, hair or nails.  NEUROLOGIC:  No Headaches, seizures or weakness.  PSYCHIATRIC:  No disorder of thought or mood.  ENDOCRINE:  No heat or cold intolerance  HEMATOLOGICAL:  No easy bruising or bleeding.    =======================================================  Allergies  No Known Allergies    ======================================================  Physical Exam:  ============    General:  No acute distress.  OBESE  Eye: Pupils are equal, round and reactive to light, Extraocular movements are intact, Normal conjunctiva.  HENT: Normocephalic, Oral mucosa is moist, No pharyngeal erythema, No sinus tenderness.  LEFT side of face with less erythema  Swelling is present,  surgical incision closed with sutures.   Penrose drain n place  FAIR Dentition  Neck: Supple, No lymphadenopathy in axillae  Respiratory: Lungs are clear to auscultation, Respirations are non-labored.  Cardiovascular: Normal rate, Regular rhythm,   Gastrointestinal: Soft, Non-tender, Non-distended, Normal bowel sounds.  Genitourinary: No costovertebral angle tenderness.  Lymphatics: No lymphadenopathy neck,   Musculoskeletal: Normal range of motion, Normal strength.  Integumentary: No rash.  Neurologic: Alert, Oriented, No focal deficits, Cranial Nerves II-XII are grossly intact.  Psychiatric: Appropriate mood & affect.    =======================================================       Vitals:  ============  T(F): 98.1 (23 Jun 2021 11:22), Max: 99.2 (22 Jun 2021 18:11)  HR: 67 (23 Jun 2021 11:22)  BP: 113/71 (23 Jun 2021 11:22)  RR: 18 (23 Jun 2021 11:22)  SpO2: 94% (23 Jun 2021 11:22) (94% - 98%)  temp max in last 48H T(F): , Max: 99.2 (06-22-21 @ 18:11)    =======================================================  Current Antibiotics:  amoxicillin  875 milliGRAM(s)/clavulanate 1 Tablet(s) Oral two times a day    Other medications:  dextrose 40% Gel 15 Gram(s) Oral once  dextrose 5%. 1000 milliLiter(s) IV Continuous <Continuous>  dextrose 5%. 1000 milliLiter(s) IV Continuous <Continuous>  dextrose 50% Injectable 25 Gram(s) IV Push once  dextrose 50% Injectable 12.5 Gram(s) IV Push once  dextrose 50% Injectable 25 Gram(s) IV Push once  glucagon  Injectable 1 milliGRAM(s) IntraMuscular once  heparin   Injectable 5000 Unit(s) SubCutaneous every 12 hours  insulin glargine Injectable (LANTUS) 20 Unit(s) SubCutaneous at bedtime  insulin lispro (ADMELOG) corrective regimen sliding scale   SubCutaneous three times a day before meals  insulin lispro Injectable (ADMELOG) 4 Unit(s) SubCutaneous three times a day before meals  saccharomyces boulardii 250 milliGRAM(s) Oral two times a day      =======================================================  Labs:       Culture - Blood (collected 06-16-21 @ 07:23)  Source: .Blood Blood  Final Report (06-21-21 @ 09:00):    No growth at 5 days.    Culture - Blood (collected 06-16-21 @ 06:45)  Source: .Blood Blood  Final Report (06-21-21 @ 07:00):    No growth at 5 days.    Culture - Blood (collected 06-12-21 @ 23:48)  Source: .Blood Blood  Final Report (06-18-21 @ 01:00):    No growth at 5 days.    Culture - Blood (collected 06-12-21 @ 23:48)  Source: .Blood Blood  Final Report (06-18-21 @ 01:00):    No growth at 5 days.           COVID-19 PCR: NotDetec (06-16-21 @ 12:35)      < from: CT Neck Soft Tissue w/ IV Cont (06.16.21 @ 21:39) >     EXAM:  CT NECK SOFT TISSUE IC                          PROCEDURE DATE:  06/16/2021          INTERPRETATION:  CLINICAL INFORMATION: 50 yo male no PMHx presents to ED c/o left sided jaw pain x4 days. Patient presented 6/12, diagnosed with parotitis.Presented to ENT specialist next day, abx changed from Augmentin to Clindamycin. Patient states pain and swelling is worsening. Difficulty opening mouth. Last medicated with either ibuprofen or acetaminophen 2 hours PTA (unsure which). No further complaints at this time. Denies fever, difficulty breathing.    TECHNIQUE: CT soft tissue neck.  Transaxial images were acquired from the skull base through to the aortic arch at the ministration of IV contrast. Coronal and sagittal reformations were also obtained.  98 mls of Omnipaque 300 were administered intravenously without untoward event. 2 mls discarded.    COMPARISON: None.    FINDINGS:  There is increased inflammatory-type change in the left face and neck subcutaneous fat as well as new inflammatory change in the right lower neck subcutaneous fat. Increased inflammatory changes also seen in the left submandibular fat and now within the right submandibular fat. There is reactive thickening of the platysma muscles, increased on the left and new on the right. This appears to be related to worsening inflammation of the left parotid gland and new inflammatory changes in the submandibular glands. No sialolith or ductal dilatation. The right parotid gland is unremarkable. There is reactive edema with enlargement of the left masseter muscle.    There is new mild reactive edema in the left pharyngeal wall. The aerodigestive tract is otherwise unremarkable without masslike asymmetry or nodularity.    Evaluation of the various lesvia stations again demonstrate mildly enlarged left level 2A and B lymph nodes, the largest being a left to a jugulodigastric node measuring up to 2.1 cm.    The thyroid gland is unremarkable.    The carotid and vertebral arteries are patent.    The visualized intracranial and intraorbital compartments are unremarkable.    There is no lucent or sclerotic lesion.    The visualized lung apices are clear.    IMPRESSION:  Worsening sialoadenitis which can be infectious or inflammatory in etiology.  Stable left cervical adenopathy, presumably reactive.         POONAM GARCIA MD; Attending Radiologist  This document has been electronically signed. Jun 17 2021  2:57AM    < end of copied text >

## 2021-06-23 NOTE — DISCHARGE NOTE PROVIDER - NSDCCPCAREPLAN_GEN_ALL_CORE_FT
PRINCIPAL DISCHARGE DIAGNOSIS  Diagnosis: Parotiditis  Assessment and Plan of Treatment: Complete antibiotic course.  Follow up with primary doctor, ENT and ID.      SECONDARY DISCHARGE DIAGNOSES  Diagnosis: DM (diabetes mellitus)  Assessment and Plan of Treatment: Continue current medications as prescribed.  Follow up with primary doctor and endocrinology.  Check FS BID and document FS, bring to appointment.

## 2021-06-23 NOTE — DIETITIAN INITIAL EVALUATION ADULT. - OTHER INFO
51 M with uncontrolled DM presenting with worsening parotitis, was admitted and started on Meropenem and Clyndamycin. ID and ENT team were consulted. Repeat CT on 06/19 with worsening of phlegmona and developing abscess. He went to OR on 06/19 fro I&D by ENT. He slowly improving. A1c 7.8 noted. Pt continues wit good po intake, consuming >75% of meals. Provided with written and verbal DM nutrition education. Pt encouraged to see an outside RD after d/c, he denied prior nutrition education. Last documented BM 6/19.

## 2021-06-23 NOTE — DISCHARGE NOTE PROVIDER - CARE PROVIDER_API CALL
Lety Humphries)  EndocrinologyMetabDiabetes; Internal Medicine  [address] [place] [address]  Phone: [phone]  Fax: [phone]  Follow Up Time:     Christopher Chauhan  INFECTIOUS DISEASE  [address] [facility]  ,  [facility]  Phone: [phone]  Fax: [phone]  Follow Up Time:     Primary doctor,   Phone: (   )    -  Fax: (   )    -  Follow Up Time:     Freda Guzman)  Otolaryngology  [address] [place] [address]  Phone: [phone]  Fax: [phone]  Follow Up Time:

## 2021-06-23 NOTE — DISCHARGE NOTE NURSING/CASE MANAGEMENT/SOCIAL WORK - PATIENT PORTAL LINK FT
You can access the FollowMyHealth Patient Portal offered by [facility] by registering at the following website: [email or web address]. By joining [facility]’s FollowMyHealth portal, you will also be able to view your health information using other applications (apps) compatible with our system.

## 2021-06-23 NOTE — DISCHARGE NOTE PROVIDER - PROVIDER TOKENS
PROVIDER:[TOKEN:[[id number]]],PROVIDER:[TOKEN:[[id number]]],FREE:[LAST:[Primary doctor],PHONE:[(   )    -],FAX:[(   )    -]],PROVIDER:[TOKEN:[[id number]]]

## 2021-06-23 NOTE — DISCHARGE NOTE PROVIDER - NSDCMRMEDTOKEN_GEN_ALL_CORE_FT
alcohol swabs : Apply topically to affected area 4 times a day   clindamycin 300 mg oral capsule: 1 cap(s) orally every 8 hours   glipiZIDE 5 mg oral tablet: 1 tab(s) orally 2 times a day   glucometer (per patient&#x27;s insurance): Test blood sugars four times a day. Dispense #1 glucometer.  lancets: 1 application subcutaneously 2 times a day   metFORMIN 1000 mg oral tablet: 1 tab(s) orally 2 times a day   oxycodone-acetaminophen 5 mg-325 mg oral tablet: 1 tab(s) orally every 4 hours, As needed, Moderate Pain (4 - 6) MDD:6tabs  saccharomyces boulardii lyo 250 mg oral capsule: 1 cap(s) orally 2 times a day  test strips (per patient&#x27;s insurance): 1 application subcutaneously 2 times a day . ** Compatible with patient&#x27;s glucometer **   Tylenol 325 mg oral capsule: 2 cap(s) orally every 6 hours

## 2021-06-23 NOTE — PROGRESS NOTE ADULT - REASON FOR ADMISSION
Parotitis

## 2021-06-23 NOTE — PROGRESS NOTE ADULT - NSICDXPILOT_GEN_ALL_CORE
[place]

## 2021-06-23 NOTE — PROGRESS NOTE ADULT - ASSESSMENT
51 M with uncontrolled DM presenting with worsening parotitis, was admitted and started on Meropenem and Clyndamycin. ID and ENT team were consulted.   Repeat CT on 06/19 with worsening of phlegmona and developing abscess. He went to OR on 06/19 fro I&D by ENT.       DM uncontrolled :A1c 7.8% Pt states he refused medications in past, has tried diet control  - continue  Lantus 20 and Lispro 4 units tid with meals and ssi  - fiingerstick ac tid hs  -When ready to discharge, may send christiano on oral meds like metformin  1000 mg bid, glipizide 5 mg bid with meals, and lantus daily.     Severe parotitis, s/p I&D on 06/19  Continue abx  On Dilaudid for pain   follow ENT and ID consults      Hypokalemia / Hypocalcemia   replace K and Ca

## 2021-06-23 NOTE — DISCHARGE NOTE PROVIDER - CARE PROVIDERS DIRECT ADDRESSES
,DirectAddress_Unknown,[email or web address],DirectAddress_Unknown,DirectAddress_Unknown

## 2021-06-23 NOTE — PROGRESS NOTE ADULT - SUBJECTIVE AND OBJECTIVE BOX
INTERVAL HPI/OVERNIGHT EVENTS: follow up of diabetes, no events over night    MEDICATIONS  (STANDING):  dextrose 40% Gel 15 Gram(s) Oral once  dextrose 5%. 1000 milliLiter(s) (50 mL/Hr) IV Continuous <Continuous>  dextrose 5%. 1000 milliLiter(s) (100 mL/Hr) IV Continuous <Continuous>  dextrose 50% Injectable 25 Gram(s) IV Push once  dextrose 50% Injectable 12.5 Gram(s) IV Push once  dextrose 50% Injectable 25 Gram(s) IV Push once  glucagon  Injectable 1 milliGRAM(s) IntraMuscular once  heparin   Injectable 5000 Unit(s) SubCutaneous every 12 hours  insulin glargine Injectable (LANTUS) 20 Unit(s) SubCutaneous at bedtime  insulin lispro (ADMELOG) corrective regimen sliding scale   SubCutaneous three times a day before meals  insulin lispro Injectable (ADMELOG) 3 Unit(s) SubCutaneous three times a day before meals  meropenem  IVPB 1000 milliGRAM(s) IV Intermittent every 8 hours  saccharomyces boulardii 250 milliGRAM(s) Oral two times a day    MEDICATIONS  (PRN):  acetaminophen   Tablet .. 325 milliGRAM(s) Oral every 4 hours PRN Temp greater or equal to 38C (100.4F), Mild Pain (1 - 3)  HYDROmorphone  Injectable 0.5 milliGRAM(s) IV Push every 6 hours PRN Severe Pain (7 - 10)  morphine  - Injectable 2 milliGRAM(s) IV Push every 4 hours PRN Moderate Pain (4 - 6)      Allergies    No Known Allergies    Intolerances        Review of systems: eating well, no complains, no n/v, no CP, no SOB, no pain    Vital Signs Last 24 Hrs  T(C): 36.6 (20 Jun 2021 11:03), Max: 37 (19 Jun 2021 17:50)  T(F): 97.8 (20 Jun 2021 11:03), Max: 98.6 (19 Jun 2021 17:50)  HR: 67 (20 Jun 2021 11:03) (49 - 77)  BP: 122/69 (20 Jun 2021 11:03) (122/69 - 164/88)  BP(mean): --  RR: 18 (20 Jun 2021 11:03) (11 - 20)  SpO2: 91% (20 Jun 2021 11:03) (91% - 100%)    CONSTITUTIONAL: NAD, well-developed, well-groomed  RESPIRATORY: Normal respiratory effort, clear to auscultation bilaterally  CARDIOVASCULAR:RRR normal S1 and S2, no murmur/rub/gallop; No lower extremity edema  ABDOMEN: Nontender to palpation, normal bowel sounds  PSYCH: A+O to person, place, and time; affect appropriate  SKIN: No rashes; no palpable lesions        LABS:                        13.6   10.72 )-----------( 260      ( 20 Jun 2021 07:04 )             41.2     06-20    136  |  99  |  16.1  ----------------------------<  247<H>  5.0   |  29.0  |  0.99    Ca    8.8      20 Jun 2021 07:04      POCT Blood Glucose.: 184 mg/dL (06-20-21 @ 11:21)  POCT Blood Glucose.: 189 mg/dL (06-20-21 @ 09:29)  POCT Blood Glucose.: 251 mg/dL (06-19-21 @ 23:00)  POCT Blood Glucose.: 124 mg/dL (06-19-21 @ 18:11)  POCT Blood Glucose.: 136 mg/dL (06-19-21 @ 16:20)  POCT Blood Glucose.: 148 mg/dL (06-19-21 @ 11:28)  POCT Blood Glucose.: 165 mg/dL (06-19-21 @ 08:04)  POCT Blood Glucose.: 223 mg/dL (06-18-21 @ 21:38)  POCT Blood Glucose.: 171 mg/dL (06-18-21 @ 16:56)  POCT Blood Glucose.: 161 mg/dL (06-18-21 @ 11:27)  POCT Blood Glucose.: 157 mg/dL (06-18-21 @ 08:10)  POCT Blood Glucose.: 181 mg/dL (06-17-21 @ 21:17)  POCT Blood Glucose.: 185 mg/dL (06-17-21 @ 17:12)     INTERVAL HPI/OVERNIGHT EVENTS: follow up of diabetes, no events over night    MEDICATIONS  (STANDING):  dextrose 40% Gel 15 Gram(s) Oral once  dextrose 5%. 1000 milliLiter(s) (50 mL/Hr) IV Continuous <Continuous>  dextrose 5%. 1000 milliLiter(s) (100 mL/Hr) IV Continuous <Continuous>  dextrose 50% Injectable 25 Gram(s) IV Push once  dextrose 50% Injectable 12.5 Gram(s) IV Push once  dextrose 50% Injectable 25 Gram(s) IV Push once  glucagon  Injectable 1 milliGRAM(s) IntraMuscular once  heparin   Injectable 5000 Unit(s) SubCutaneous every 12 hours  insulin glargine Injectable (LANTUS) 20 Unit(s) SubCutaneous at bedtime  insulin lispro (ADMELOG) corrective regimen sliding scale   SubCutaneous three times a day before meals  insulin lispro Injectable (ADMELOG) 3 Unit(s) SubCutaneous three times a day before meals  meropenem  IVPB 1000 milliGRAM(s) IV Intermittent every 8 hours  saccharomyces boulardii 250 milliGRAM(s) Oral two times a day    MEDICATIONS  (PRN):  acetaminophen   Tablet .. 325 milliGRAM(s) Oral every 4 hours PRN Temp greater or equal to 38C (100.4F), Mild Pain (1 - 3)  HYDROmorphone  Injectable 0.5 milliGRAM(s) IV Push every 6 hours PRN Severe Pain (7 - 10)  morphine  - Injectable 2 milliGRAM(s) IV Push every 4 hours PRN Moderate Pain (4 - 6)      Allergies    No Known Allergies          Review of systems: eating well, no complains, no n/v, no CP, no SOB, no pain    Vital Signs Last 24 Hrs  T(C): 36.6 (20 Jun 2021 11:03), Max: 37 (19 Jun 2021 17:50)  T(F): 97.8 (20 Jun 2021 11:03), Max: 98.6 (19 Jun 2021 17:50)  HR: 67 (20 Jun 2021 11:03) (49 - 77)  BP: 122/69 (20 Jun 2021 11:03) (122/69 - 164/88)  BP(mean): --  RR: 18 (20 Jun 2021 11:03) (11 - 20)  SpO2: 91% (20 Jun 2021 11:03) (91% - 100%)    CONSTITUTIONAL: NAD, well-developed, well-groomed  RESPIRATORY: Normal respiratory effort, clear to auscultation bilaterally  CARDIOVASCULAR:RRR normal S1 and S2, no murmur/rub/gallop; No lower extremity edema  ABDOMEN: Nontender to palpation, normal bowel sounds  PSYCH: A+O to person, place, and time; affect appropriate  SKIN: No rashes; no palpable lesions        LABS:                        13.6   10.72 )-----------( 260      ( 20 Jun 2021 07:04 )             41.2     06-20    136  |  99  |  16.1  ----------------------------<  247<H>  5.0   |  29.0  |  0.99    Ca    8.8      20 Jun 2021 07:04      POCT Blood Glucose.: 184 mg/dL (06-20-21 @ 11:21)  POCT Blood Glucose.: 189 mg/dL (06-20-21 @ 09:29)  POCT Blood Glucose.: 251 mg/dL (06-19-21 @ 23:00)  POCT Blood Glucose.: 124 mg/dL (06-19-21 @ 18:11)  POCT Blood Glucose.: 136 mg/dL (06-19-21 @ 16:20)  POCT Blood Glucose.: 148 mg/dL (06-19-21 @ 11:28)  POCT Blood Glucose.: 165 mg/dL (06-19-21 @ 08:04)  POCT Blood Glucose.: 223 mg/dL (06-18-21 @ 21:38)  POCT Blood Glucose.: 171 mg/dL (06-18-21 @ 16:56)  POCT Blood Glucose.: 161 mg/dL (06-18-21 @ 11:27)  POCT Blood Glucose.: 157 mg/dL (06-18-21 @ 08:10)  POCT Blood Glucose.: 181 mg/dL (06-17-21 @ 21:17)  POCT Blood Glucose.: 185 mg/dL (06-17-21 @ 17:12)

## 2021-06-23 NOTE — DISCHARGE NOTE PROVIDER - HOSPITAL COURSE
51 M with uncontrolled DM presenting with worsening parotitis, was admitted and started on Meropenem and Clindamycin. ID and ENT team were consulted. Repeat CT on 06/19 with worsening of phlegmona and developing abscess. He went to OR on 06/19 for I&D by ENT with penrose drain placed.  Patient improved with IV antibiotics.  ENT removed drain 6/22/21.  Pain controlled.  Patient transitioned to PO Augmentin.  Patient developed rash, possibly secondary to Augmentin and changed to Clindamycin after discussion with ID.  Patient followed by endocrinology for uncontrolled DM2 with A1c 7.8.  FS controlled. Counseled extensively regarding compliance with diet and medications.  Patient stable for discharge to home with outpatient follow up with primary doctor, ID, ENT, and endocrinology.      Vital Signs Last 24 Hrs  T(C): 36.7 (23 Jun 2021 11:22), Max: 37.3 (22 Jun 2021 18:11)  T(F): 98.1 (23 Jun 2021 11:22), Max: 99.2 (22 Jun 2021 18:11)  HR: 67 (23 Jun 2021 11:22) (60 - 77)  BP: 113/71 (23 Jun 2021 11:22) (112/76 - 124/69)  BP(mean): --  RR: 18 (23 Jun 2021 11:22) (18 - 18)  SpO2: 94% (23 Jun 2021 11:22) (94% - 98%)    PHYSICAL EXAM:  GENERAL: NAD, well-groomed, well-developed  HEAD:  Atraumatic, Normocephalic  NECK: Supple, No JVD, Normal thyroid; Left side of neck with improved swelling, sutures in place  NERVOUS SYSTEM:  Alert & Oriented X3, Good concentration; Motor Strength 5/5 B/L upper and lower extremities  CHEST/LUNG: Clear to auscultation bilaterally  HEART: Regular rate and rhythm; No murmurs, rubs, or gallops  ABDOMEN: Soft, Nontender, Nondistended; Bowel sounds present  EXTREMITIES:  2+ Peripheral Pulses, No clubbing, cyanosis, or edema  SKIN: generalized rash noted on back     51 M with uncontrolled DM presenting with worsening parotitis, was admitted and started on Meropenem and Clindamycin. ID and ENT team were consulted. Repeat CT on 06/19 with worsening of phlegmona and developing abscess. He went to OR on 06/19 for I&D by ENT with penrose drain placed.  Patient improved with IV antibiotics.  ENT removed drain 6/22/21.  Pain controlled.  Patient transitioned to PO Augmentin.  Patient developed rash, possibly secondary to Augmentin and changed to Clindamycin after discussion with ID.  Patient followed by endocrinology for uncontrolled DM2 with A1c 7.8.  FS controlled. Counseled extensively regarding compliance with diet and medications.  Patient stable for discharge to home with outpatient follow up with primary doctor, ID, ENT, and endocrinology.      Vital Signs Last 24 Hrs  T(C): 36.7 (23 Jun 2021 11:22), Max: 37.3 (22 Jun 2021 18:11)  T(F): 98.1 (23 Jun 2021 11:22), Max: 99.2 (22 Jun 2021 18:11)  HR: 67 (23 Jun 2021 11:22) (60 - 77)  BP: 113/71 (23 Jun 2021 11:22) (112/76 - 124/69)  BP(mean): --  RR: 18 (23 Jun 2021 11:22) (18 - 18)  SpO2: 94% (23 Jun 2021 11:22) (94% - 98%)    PHYSICAL EXAM:  GENERAL: NAD, well-groomed, well-developed  HEAD:  Atraumatic, Normocephalic  NECK: Supple, No JVD, Normal thyroid; Left side of neck with improved swelling, sutures in place  NERVOUS SYSTEM:  Alert & Oriented X3, Good concentration; Motor Strength 5/5 B/L upper and lower extremities  CHEST/LUNG: Clear to auscultation bilaterally  HEART: Regular rate and rhythm; No murmurs, rubs, or gallops  ABDOMEN: Soft, Nontender, Nondistended; Bowel sounds present  EXTREMITIES:  2+ Peripheral Pulses, No clubbing, cyanosis, or edema  SKIN: generalized rash noted on back    spent 35 min on discharge

## 2021-07-03 NOTE — CDI QUERY NOTE - NSCDIOTHERTXTBX_GEN_ALL_CORE_HH
SUPPORTING DOCUMENTATION / EVIDENCE:  Admitted with worsening acute parotiditis and uncontrolled DM.  H&P = sepsis    H&P:  Admit to medicine (any bed)  Sepsis   Secondary to parotitis, no fluid collection noted on CT or US    VITALS:  .6    125//89  16-20  93-97%                    WBC:  15.70    BANDS: Neutrophil #13.14, %83.6    LACTATE:  1.50    ANTIBIOTICS:  amoxicillin  875 milliGRAM(s)/clavulanate   1 Tablet(s) Oral (06-21-21)   1 Tablet(s) Oral (06-22-21)   1 Tablet(s) Oral (06-22-21)   1 Tablet(s) Oral (06-23-21)    clindamycin IVPB   100 mL/Hr IV Intermittent (06-16-21)    clindamycin IVPB   100 mL/Hr IV Intermittent (06-17-21)   100 mL/Hr IV Intermittent (06-17-21)   100 mL/Hr IV Intermittent (06-17-21)   100 mL/Hr IV Intermittent (06-18-21)   100 mL/Hr IV Intermittent (06-18-21)   100 mL/Hr IV Intermittent (06-18-21)   100 mL/Hr IV Intermittent (06-19-21)   100 mL/Hr IV Intermittent (06-19-21)   100 mL/Hr IV Intermittent (06-19-21)    meropenem  IVPB   100 mL/Hr IV Intermittent (06-17-21)   100 mL/Hr IV Intermittent (06-17-21)   100 mL/Hr IV Intermittent (06-17-21)   100 mL/Hr IV Intermittent (06-18-21)   100 mL/Hr IV Intermittent (06-18-21)   100 mL/Hr IV Intermittent (06-18-21)   100 mL/Hr IV Intermittent (06-19-21)   100 mL/Hr IV Intermittent (06-19-21)   100 mL/Hr IV Intermittent (06-19-21)   100 mL/Hr IV Intermittent (06-20-21)   100 mL/Hr IV Intermittent (06-20-21)   100 mL/Hr IV Intermittent (06-20-21)   100 mL/Hr IV Intermittent (06-21-21)    piperacillin/tazobactam IVPB.   200 mL/Hr IV Intermittent (06-16-21)    piperacillin/tazobactam IVPB..   25 mL/Hr IV Intermittent (06-16-21)   25 mL/Hr IV Intermittent (06-17-21)        Please clarify, in addendum to dc summary, the status of sepsis.   - Early sepsis on admission, resolved   - Sepsis on admission 2/2 parotiditis resolved   - Sepsis ruled out   - Other   - Not clinically significant    Thank you

## 2022-04-05 PROBLEM — E11.9 TYPE 2 DIABETES MELLITUS WITHOUT COMPLICATIONS: Chronic | Status: ACTIVE | Noted: 2021-06-16

## 2022-05-16 DIAGNOSIS — Z87.19 PERSONAL HISTORY OF OTHER DISEASES OF THE DIGESTIVE SYSTEM: ICD-10-CM

## 2022-05-16 RX ORDER — GLIPIZIDE 5 MG/1
5 TABLET ORAL TWICE DAILY
Refills: 0 | Status: ACTIVE | COMMUNITY

## 2022-05-17 ENCOUNTER — APPOINTMENT (OUTPATIENT)
Dept: CARDIOLOGY | Facility: CLINIC | Age: 52
End: 2022-05-17

## 2023-01-24 NOTE — DISCHARGE NOTE PROVIDER - NSDCQMERRANDS_GEN_ALL_CORE
Kenalog Preparation: Kenalog Expiration Date For Kenalog (Optional): 4/24 No Total Volume (Ccs): 0.3 How Many Mls Were Removed From The 40 Mg/Ml (5ml) Vial When Preparing The Injectable Solution?: 0 Consent: The risks of atrophy were reviewed with the patient. Lot # For Kenalog (Optional): [id number] Medical Necessity Clause: This procedure was medically necessary because the lesions that were treated were: X Size Of Lesion In Cm (Optional): 5 Administered By (Optional): [email or web address] Validate Note Data When Using Inventory: Yes Size Of Lesion (Optional): 4.5 Include Z78.9 (Other Specified Conditions Influencing Health Status) As An Associated Diagnosis?: No Detail Level: Detailed Concentration Of Kenalog Solution Injected (Mg/Ml): 5.0

## 2023-06-29 NOTE — PRE-OP CHECKLIST - LAST TOOK
solids Advancement Flap (Single) Text: The defect edges were debeveled with a #15 scalpel blade.  Given the location of the defect and the proximity to free margins a single advancement flap was deemed most appropriate.  Using a sterile surgical marker, an appropriate advancement flap was drawn incorporating the defect and placing the expected incisions within the relaxed skin tension lines where possible.    The area thus outlined was incised deep to adipose tissue with a #15 scalpel blade.  The skin margins were undermined to an appropriate distance in all directions utilizing iris scissors.

## 2023-07-05 NOTE — PATIENT PROFILE ADULT - NSPROPOAURINARYCATHETER_GEN_A_NUR
Gen: Patient is well-appearing, NAD, AAOx3, able to ambulate without assistance  HEENT: NCAT, normal conjunctiva, tongue midline, oral mucosa moist  Lung: CTAB, no respiratory distress, no wheezes/rhonchi/rales B/L, speaking in full sentences  CV: RRR, no murmurs, rubs or gallops, distal pulses 2+ b/l  Abd: soft, NT, ND, no guarding, no rigidity, no rebound tenderness, no CVA tenderness   : normal  exam, no diaper rash noted   MSK: no visible deformities, ROM normal in UE/LE  Neuro: No focal sensory or motor deficits  Skin: Warm, well perfused, no leg swelling  Psych: normal affect, calm no

## 2024-11-25 NOTE — PROGRESS NOTE ADULT - ASSESSMENT
This 51 year old male with DM (has refused medications, attempting to diet control) presents with increasing pain over left jaw. Reports that initially started as a lump at angle of jaw last Tuesday. Swelling continued and was associated with increasing pain in the region for which he presented to the ER and was prescribed Augmentin and referred to ENT. Was advised by ED to stat warm compresses nd abx changed to clindamycin. States that had very mild improvement in swelling and pain transiently for a day, however last Monday has started increasing in size again Now extremely painful.   Subjectively reports feeling warm with possible rigors.   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   No abdominal pain, changes in gastrointestinal or genitourinary habits, no testicular pain or swelling.   (16 Jun 2021 13:08)    patient denies facial trauma.  was seen in ER prior to this, given antibiotics and sucking candy w/o improvement.   patient started on IV Zosyn by medical team.       Impression:  parotiditis  WBC elevation  diabetes    Plan:  s/p incision and drainage  passive drain in now removed    clinically stable/ improving  continue Augmentin 875mg PO BID x 14 days.    THRU  7/4/21         WBC elevation is reactive, now normalized  WBC Count: 9.81 K/uL (06-21-21 @ 07:08)  WBC Count: 10.72 K/uL (06-20-21 @ 07:04)     Regarding Diabetes  - needs adequate control for wound healing  - suboptimal control: A1C with Estimated Average Glucose Result: 7.8 % (06-17-21 @ 07:33)  -  ENDOCRINE inpt appreciated.   Planned for eventual home regiment of PO and injected therapy.          No further specific infectious disease recommendations. Will be available as needed.      Consider discharge to community from ID point of view once antibiotics have been arranged.     Family

## 2024-12-20 NOTE — DISCHARGE NOTE NURSING/CASE MANAGEMENT/SOCIAL WORK - NSTOBACCONEVERSMOKERY/N_GEN_A
No
N/A Patient is under age 18 and does not have a history of high risk behavior or is not high risk for Hep C
